# Patient Record
Sex: FEMALE | Race: OTHER | Employment: FULL TIME | URBAN - METROPOLITAN AREA
[De-identification: names, ages, dates, MRNs, and addresses within clinical notes are randomized per-mention and may not be internally consistent; named-entity substitution may affect disease eponyms.]

---

## 2019-08-07 ENCOUNTER — OFFICE VISIT (OUTPATIENT)
Dept: NEUROLOGY | Facility: CLINIC | Age: 19
End: 2019-08-07
Payer: MEDICAID

## 2019-08-07 ENCOUNTER — TELEPHONE (OUTPATIENT)
Dept: NEUROLOGY | Facility: CLINIC | Age: 19
End: 2019-08-07

## 2019-08-07 VITALS
WEIGHT: 157 LBS | HEART RATE: 68 BPM | RESPIRATION RATE: 16 BRPM | DIASTOLIC BLOOD PRESSURE: 74 MMHG | SYSTOLIC BLOOD PRESSURE: 122 MMHG

## 2019-08-07 DIAGNOSIS — G40.909 SEIZURE DISORDER (HCC): Primary | ICD-10-CM

## 2019-08-07 DIAGNOSIS — R51.9 CHRONIC DAILY HEADACHE: ICD-10-CM

## 2019-08-07 PROCEDURE — 99204 OFFICE O/P NEW MOD 45 MIN: CPT | Performed by: OTHER

## 2019-08-07 RX ORDER — LEVETIRACETAM 500 MG/1
500 TABLET ORAL 2 TIMES DAILY
Qty: 60 TABLET | Refills: 2 | Status: SHIPPED | OUTPATIENT
Start: 2019-08-07 | End: 2019-08-26

## 2019-08-07 RX ORDER — LEVETIRACETAM 500 MG/1
500 TABLET ORAL 2 TIMES DAILY
COMMUNITY
End: 2019-08-07

## 2019-08-07 RX ORDER — TOPIRAMATE 25 MG/1
25 TABLET ORAL NIGHTLY
Qty: 30 TABLET | Refills: 2 | Status: SHIPPED | OUTPATIENT
Start: 2019-08-07 | End: 2019-08-26

## 2019-08-07 NOTE — TELEPHONE ENCOUNTER
DOUG faxed to Dr Joseph Ribera office at fax number [phone]; confirmation received. Signed DOUG sent to Scanning Department.

## 2019-08-07 NOTE — PROGRESS NOTES
HPI:    Patient ID: Cindy Reyes is a 22year old female. PCP: Dr Alan Patient    HPI  Crissy Yu is a 22year old female with history of seizures who presented to established care with us.  She was following with Dr Sabas Begum Pediatric neurologist. She Tab Take 500 mg by mouth 2 (two) times daily. Disp:  Rfl:      Allergies: Allergies no known allergies   PHYSICAL EXAM:   Physical Exam    Vitals reviewed  General: well developed, well nourished  HEENT: Normocephalic and atraumatic.    Cardiovascular: Romina Heller Topamax for headache prevention. Hydrate well. Follow up with your primary for anemia management      Thank you for allowing us to participate in your patient's care. Please do not hesitate to call if you have any questions.      Charis Kanner, MD Christena Serene

## 2019-08-07 NOTE — PROGRESS NOTES
The patient is here for seizures. The patient states her seizures started in 2013. The patient states her last seizure was on 01/25/19. Patient is having fatigue, headaches daily and lightheadedness.  The patient denies any balance issues, memory or speech

## 2019-08-13 ENCOUNTER — ORDER TRANSCRIPTION (OUTPATIENT)
Dept: ADMINISTRATIVE | Facility: HOSPITAL | Age: 19
End: 2019-08-13

## 2019-08-13 DIAGNOSIS — G40.909 SEIZURE DISORDER (HCC): Primary | ICD-10-CM

## 2019-08-18 ENCOUNTER — HOSPITAL ENCOUNTER (OUTPATIENT)
Dept: MRI IMAGING | Age: 19
Discharge: HOME | End: 2019-08-18
Attending: Other
Payer: MEDICAID

## 2019-08-18 DIAGNOSIS — R51.9 CHRONIC DAILY HEADACHE: ICD-10-CM

## 2019-08-18 DIAGNOSIS — G40.909 SEIZURE DISORDER (HCC): ICD-10-CM

## 2019-08-18 PROCEDURE — A9575 INJ GADOTERATE MEGLUMI 0.1ML: HCPCS | Performed by: OTHER

## 2019-08-18 PROCEDURE — 70553 MRI BRAIN STEM W/O & W/DYE: CPT | Performed by: OTHER

## 2019-08-22 ENCOUNTER — TELEPHONE (OUTPATIENT)
Dept: NEUROLOGY | Facility: CLINIC | Age: 19
End: 2019-08-22

## 2019-08-22 NOTE — TELEPHONE ENCOUNTER
----- Message from Osmar Yarbrough MD sent at 8/21/2019  1:59 PM CDT -----  Multiple areas of chronic blood products ? Small cavernomas which patient has a known history of some abnormalities on previous MRI brain images.  Please bring old MRI images and

## 2019-08-22 NOTE — TELEPHONE ENCOUNTER
Pt called back, informed her of results below. Also requested pt bring in past MRI images and reports. Pt expressed understanding and was encouraged to call office with any further questions.

## 2019-08-26 ENCOUNTER — HOSPITAL ENCOUNTER (EMERGENCY)
Facility: HOSPITAL | Age: 19
Discharge: HOME | End: 2019-08-26
Attending: EMERGENCY MEDICINE
Payer: MEDICAID

## 2019-08-26 ENCOUNTER — TELEPHONE (OUTPATIENT)
Dept: NEUROLOGY | Facility: CLINIC | Age: 19
End: 2019-08-26

## 2019-08-26 VITALS
WEIGHT: 157 LBS | HEART RATE: 65 BPM | RESPIRATION RATE: 12 BRPM | OXYGEN SATURATION: 100 % | BODY MASS INDEX: 27.82 KG/M2 | DIASTOLIC BLOOD PRESSURE: 76 MMHG | SYSTOLIC BLOOD PRESSURE: 123 MMHG | TEMPERATURE: 98 F | HEIGHT: 63 IN

## 2019-08-26 DIAGNOSIS — R51.9 FREQUENT HEADACHES: Primary | ICD-10-CM

## 2019-08-26 LAB
ALBUMIN SERPL-MCNC: 4 G/DL (ref 3.4–5)
ALBUMIN/GLOB SERPL: 1.2 {RATIO} (ref 1–2)
ALP LIVER SERPL-CCNC: 91 U/L (ref 52–144)
ALT SERPL-CCNC: 36 U/L (ref 13–56)
ANION GAP SERPL CALC-SCNC: 5 MMOL/L (ref 0–18)
AST SERPL-CCNC: 14 U/L (ref 15–37)
BASOPHILS # BLD AUTO: 0.03 X10(3) UL (ref 0–0.2)
BASOPHILS NFR BLD AUTO: 0.4 %
BILIRUB SERPL-MCNC: 0.1 MG/DL (ref 0.1–2)
BILIRUB UR QL STRIP.AUTO: NEGATIVE
BUN BLD-MCNC: 14 MG/DL (ref 7–18)
BUN/CREAT SERPL: 21.2 (ref 10–20)
CALCIUM BLD-MCNC: 8.9 MG/DL (ref 8.5–10.1)
CHLORIDE SERPL-SCNC: 112 MMOL/L (ref 98–112)
CLARITY UR REFRACT.AUTO: CLEAR
CO2 SERPL-SCNC: 24 MMOL/L (ref 21–32)
COLOR UR AUTO: YELLOW
CREAT BLD-MCNC: 0.66 MG/DL (ref 0.55–1.02)
DEPRECATED RDW RBC AUTO: 40.8 FL (ref 35.1–46.3)
EOSINOPHIL # BLD AUTO: 0.17 X10(3) UL (ref 0–0.7)
EOSINOPHIL NFR BLD AUTO: 2.1 %
ERYTHROCYTE [DISTWIDTH] IN BLOOD BY AUTOMATED COUNT: 14.6 % (ref 11–15)
EXPIRATION DATE: NORMAL
GLOBULIN PLAS-MCNC: 3.4 G/DL (ref 2.8–4.4)
GLUCOSE BLD-MCNC: 85 MG/DL (ref 70–99)
GLUCOSE UR STRIP.AUTO-MCNC: NEGATIVE MG/DL
HAV IGM SER QL: 2.1 MG/DL (ref 1.6–2.6)
HCT VFR BLD AUTO: 35.8 % (ref 35–48)
HGB BLD-MCNC: 11.2 G/DL (ref 12–16)
IMM GRANULOCYTES # BLD AUTO: 0.02 X10(3) UL (ref 0–1)
IMM GRANULOCYTES NFR BLD: 0.3 %
KETONES UR STRIP.AUTO-MCNC: NEGATIVE MG/DL
LYMPHOCYTES # BLD AUTO: 3.42 X10(3) UL (ref 1–4)
LYMPHOCYTES NFR BLD AUTO: 43.1 %
M PROTEIN MFR SERPL ELPH: 7.4 G/DL (ref 6.4–8.2)
MCH RBC QN AUTO: 24.3 PG (ref 26–34)
MCHC RBC AUTO-ENTMCNC: 31.3 G/DL (ref 31–37)
MCV RBC AUTO: 77.8 FL (ref 80–100)
MONOCYTES # BLD AUTO: 0.48 X10(3) UL (ref 0.1–1)
MONOCYTES NFR BLD AUTO: 6.1 %
NEUTROPHILS # BLD AUTO: 3.81 X10 (3) UL (ref 1.5–7.7)
NEUTROPHILS # BLD AUTO: 3.81 X10(3) UL (ref 1.5–7.7)
NEUTROPHILS NFR BLD AUTO: 48 %
NITRITE UR QL STRIP.AUTO: POSITIVE
OSMOLALITY SERPL CALC.SUM OF ELEC: 292 MOSM/KG (ref 275–295)
PH UR STRIP.AUTO: 6 [PH] (ref 4.5–8)
PLATELET # BLD AUTO: 276 10(3)UL (ref 150–450)
POCT LOT NUMBER: NORMAL
POCT URINE PREGNANCY: NEGATIVE
POTASSIUM SERPL-SCNC: 3.8 MMOL/L (ref 3.5–5.1)
PROT UR STRIP.AUTO-MCNC: NEGATIVE MG/DL
RBC # BLD AUTO: 4.6 X10(6)UL (ref 3.8–5.3)
SODIUM SERPL-SCNC: 141 MMOL/L (ref 136–145)
SP GR UR STRIP.AUTO: 1.01 (ref 1–1.03)
UROBILINOGEN UR STRIP.AUTO-MCNC: <2 MG/DL
WBC # BLD AUTO: 7.9 X10(3) UL (ref 4–11)

## 2019-08-26 PROCEDURE — 85025 COMPLETE CBC W/AUTO DIFF WBC: CPT | Performed by: EMERGENCY MEDICINE

## 2019-08-26 PROCEDURE — 99284 EMERGENCY DEPT VISIT MOD MDM: CPT

## 2019-08-26 PROCEDURE — 96361 HYDRATE IV INFUSION ADD-ON: CPT

## 2019-08-26 PROCEDURE — 83735 ASSAY OF MAGNESIUM: CPT | Performed by: EMERGENCY MEDICINE

## 2019-08-26 PROCEDURE — 96374 THER/PROPH/DIAG INJ IV PUSH: CPT

## 2019-08-26 PROCEDURE — 96375 TX/PRO/DX INJ NEW DRUG ADDON: CPT

## 2019-08-26 PROCEDURE — 81001 URINALYSIS AUTO W/SCOPE: CPT | Performed by: EMERGENCY MEDICINE

## 2019-08-26 PROCEDURE — 81025 URINE PREGNANCY TEST: CPT

## 2019-08-26 PROCEDURE — 80053 COMPREHEN METABOLIC PANEL: CPT | Performed by: EMERGENCY MEDICINE

## 2019-08-26 RX ORDER — METOCLOPRAMIDE 10 MG/1
10 TABLET ORAL 3 TIMES DAILY PRN
Qty: 20 TABLET | Refills: 0 | Status: SHIPPED | OUTPATIENT
Start: 2019-08-26 | End: 2019-09-25

## 2019-08-26 RX ORDER — KETOROLAC TROMETHAMINE 30 MG/ML
30 INJECTION, SOLUTION INTRAMUSCULAR; INTRAVENOUS ONCE
Status: COMPLETED | OUTPATIENT
Start: 2019-08-26 | End: 2019-08-26

## 2019-08-26 RX ORDER — METOCLOPRAMIDE HYDROCHLORIDE 5 MG/ML
10 INJECTION INTRAMUSCULAR; INTRAVENOUS ONCE
Status: COMPLETED | OUTPATIENT
Start: 2019-08-26 | End: 2019-08-26

## 2019-08-26 RX ORDER — DIPHENHYDRAMINE HYDROCHLORIDE 50 MG/ML
25 INJECTION INTRAMUSCULAR; INTRAVENOUS ONCE
Status: COMPLETED | OUTPATIENT
Start: 2019-08-26 | End: 2019-08-26

## 2019-08-26 NOTE — ED INITIAL ASSESSMENT (HPI)
Pt c/o HA x 2 months, being treated by a neurologist, pt c/o numbness to the left side of her face x 3 days. Also c/o nausea and dizziness.

## 2019-08-26 NOTE — ED PROVIDER NOTES
Patient Seen in: [facility] Emergency Department    History   Patient presents with:  Headache (neurologic)  Numbness Weakness (neurologic)    Stated Complaint: headache 2months numbness left side since friday    HPI    Patient is a 23-year-old fema and time. She appears well-developed and well-nourished. HENT:   Head: Normocephalic and atraumatic. Mouth/Throat: Oropharynx is clear and moist.   Eyes: Pupils are equal, round, and reactive to light.  Conjunctivae and EOM are normal.   Neck: Normal ra Please view results for these tests on the individual orders.    POCT PREGNANCY, URINE   RAINBOW DRAW BLUE   RAINBOW DRAW LAVENDER   RAINBOW DRAW LIGHT GREEN   RAINBOW DRAW GOLD              MDM   47-year-old female with chronic headaches, seizure disor

## 2019-08-26 NOTE — TELEPHONE ENCOUNTER
Spoke with pt, she states that she was seen in ED over the weekend for her \"whole face \" being numb. Pt states she was unsure of diagnosis. Pt states during this call that her \"entire left side of body is number. \" Advised pt that if these are new symp

## 2019-08-27 ENCOUNTER — APPOINTMENT (OUTPATIENT)
Dept: CT IMAGING | Facility: HOSPITAL | Age: 19
End: 2019-08-27
Attending: PEDIATRICS
Payer: MEDICAID

## 2019-08-27 ENCOUNTER — APPOINTMENT (OUTPATIENT)
Dept: GENERAL RADIOLOGY | Facility: HOSPITAL | Age: 19
End: 2019-08-27
Attending: PEDIATRICS
Payer: MEDICAID

## 2019-08-27 ENCOUNTER — HOSPITAL ENCOUNTER (EMERGENCY)
Facility: HOSPITAL | Age: 19
Discharge: HOME | End: 2019-08-27
Attending: PEDIATRICS
Payer: MEDICAID

## 2019-08-27 VITALS
HEART RATE: 80 BPM | RESPIRATION RATE: 16 BRPM | TEMPERATURE: 98 F | SYSTOLIC BLOOD PRESSURE: 115 MMHG | BODY MASS INDEX: 27.31 KG/M2 | OXYGEN SATURATION: 100 % | WEIGHT: 154.13 LBS | HEIGHT: 63 IN | DIASTOLIC BLOOD PRESSURE: 66 MMHG

## 2019-08-27 DIAGNOSIS — N30.01 ACUTE CYSTITIS WITH HEMATURIA: Primary | ICD-10-CM

## 2019-08-27 LAB
ALBUMIN SERPL-MCNC: 3.9 G/DL (ref 3.4–5)
ALBUMIN/GLOB SERPL: 1.1 {RATIO} (ref 1–2)
ALP LIVER SERPL-CCNC: 89 U/L (ref 52–144)
ALT SERPL-CCNC: 34 U/L (ref 13–56)
ANION GAP SERPL CALC-SCNC: 8 MMOL/L (ref 0–18)
AST SERPL-CCNC: 16 U/L (ref 15–37)
ATRIAL RATE: 90 BPM
BASOPHILS # BLD AUTO: 0.02 X10(3) UL (ref 0–0.2)
BASOPHILS NFR BLD AUTO: 0.2 %
BILIRUB SERPL-MCNC: 0.3 MG/DL (ref 0.1–2)
BILIRUB UR QL STRIP.AUTO: NEGATIVE
BUN BLD-MCNC: 12 MG/DL (ref 7–18)
BUN/CREAT SERPL: 18.8 (ref 10–20)
CALCIUM BLD-MCNC: 8.5 MG/DL (ref 8.5–10.1)
CHLORIDE SERPL-SCNC: 113 MMOL/L (ref 98–112)
CO2 SERPL-SCNC: 23 MMOL/L (ref 21–32)
COLOR UR AUTO: YELLOW
CREAT BLD-MCNC: 0.64 MG/DL (ref 0.55–1.02)
D-DIMER: 0.32 UG/ML FEU (ref ?–0.5)
DEPRECATED RDW RBC AUTO: 40.6 FL (ref 35.1–46.3)
EOSINOPHIL # BLD AUTO: 0.09 X10(3) UL (ref 0–0.7)
EOSINOPHIL NFR BLD AUTO: 0.8 %
ERYTHROCYTE [DISTWIDTH] IN BLOOD BY AUTOMATED COUNT: 14.6 % (ref 11–15)
GLOBULIN PLAS-MCNC: 3.7 G/DL (ref 2.8–4.4)
GLUCOSE BLD-MCNC: 101 MG/DL (ref 70–99)
GLUCOSE UR STRIP.AUTO-MCNC: NEGATIVE MG/DL
HCT VFR BLD AUTO: 35.9 % (ref 35–48)
HGB BLD-MCNC: 11.2 G/DL (ref 12–16)
IMM GRANULOCYTES # BLD AUTO: 0.03 X10(3) UL (ref 0–1)
IMM GRANULOCYTES NFR BLD: 0.3 %
KETONES UR STRIP.AUTO-MCNC: NEGATIVE MG/DL
LYMPHOCYTES # BLD AUTO: 2.79 X10(3) UL (ref 1–4)
LYMPHOCYTES NFR BLD AUTO: 24.5 %
M PROTEIN MFR SERPL ELPH: 7.6 G/DL (ref 6.4–8.2)
MCH RBC QN AUTO: 24.1 PG (ref 26–34)
MCHC RBC AUTO-ENTMCNC: 31.2 G/DL (ref 31–37)
MCV RBC AUTO: 77.2 FL (ref 80–100)
MONOCYTES # BLD AUTO: 0.64 X10(3) UL (ref 0.1–1)
MONOCYTES NFR BLD AUTO: 5.6 %
NEUTROPHILS # BLD AUTO: 7.83 X10 (3) UL (ref 1.5–7.7)
NEUTROPHILS # BLD AUTO: 7.83 X10(3) UL (ref 1.5–7.7)
NEUTROPHILS NFR BLD AUTO: 68.6 %
NITRITE UR QL STRIP.AUTO: POSITIVE
OSMOLALITY SERPL CALC.SUM OF ELEC: 298 MOSM/KG (ref 275–295)
P AXIS: 60 DEGREES
P-R INTERVAL: 140 MS
PH UR STRIP.AUTO: 6 [PH] (ref 4.5–8)
PLATELET # BLD AUTO: 298 10(3)UL (ref 150–450)
POCT URINE PREGNANCY: NEGATIVE
POTASSIUM SERPL-SCNC: 3.4 MMOL/L (ref 3.5–5.1)
PROT UR STRIP.AUTO-MCNC: 30 MG/DL
Q-T INTERVAL: 344 MS
QRS DURATION: 78 MS
QTC CALCULATION (BEZET): 420 MS
R AXIS: 64 DEGREES
RBC # BLD AUTO: 4.65 X10(6)UL (ref 3.8–5.3)
RBC #/AREA URNS AUTO: >10 /HPF
SODIUM SERPL-SCNC: 144 MMOL/L (ref 136–145)
SP GR UR STRIP.AUTO: 1.02 (ref 1–1.03)
T AXIS: 60 DEGREES
TROPONIN I SERPL-MCNC: <0.045 NG/ML (ref ?–0.04)
UROBILINOGEN UR STRIP.AUTO-MCNC: <2 MG/DL
VENTRICULAR RATE: 90 BPM
WBC # BLD AUTO: 11.4 X10(3) UL (ref 4–11)
WBC #/AREA URNS AUTO: >50 /HPF

## 2019-08-27 PROCEDURE — 85379 FIBRIN DEGRADATION QUANT: CPT | Performed by: PEDIATRICS

## 2019-08-27 PROCEDURE — 87186 SC STD MICRODIL/AGAR DIL: CPT | Performed by: PEDIATRICS

## 2019-08-27 PROCEDURE — 93010 ELECTROCARDIOGRAM REPORT: CPT

## 2019-08-27 PROCEDURE — 99285 EMERGENCY DEPT VISIT HI MDM: CPT

## 2019-08-27 PROCEDURE — 87088 URINE BACTERIA CULTURE: CPT | Performed by: PEDIATRICS

## 2019-08-27 PROCEDURE — 71046 X-RAY EXAM CHEST 2 VIEWS: CPT | Performed by: PEDIATRICS

## 2019-08-27 PROCEDURE — 96361 HYDRATE IV INFUSION ADD-ON: CPT

## 2019-08-27 PROCEDURE — 85025 COMPLETE CBC W/AUTO DIFF WBC: CPT | Performed by: PEDIATRICS

## 2019-08-27 PROCEDURE — 74177 CT ABD & PELVIS W/CONTRAST: CPT | Performed by: PEDIATRICS

## 2019-08-27 PROCEDURE — 81001 URINALYSIS AUTO W/SCOPE: CPT | Performed by: PEDIATRICS

## 2019-08-27 PROCEDURE — 80053 COMPREHEN METABOLIC PANEL: CPT | Performed by: PEDIATRICS

## 2019-08-27 PROCEDURE — 87086 URINE CULTURE/COLONY COUNT: CPT | Performed by: PEDIATRICS

## 2019-08-27 PROCEDURE — 93005 ELECTROCARDIOGRAM TRACING: CPT

## 2019-08-27 PROCEDURE — 96374 THER/PROPH/DIAG INJ IV PUSH: CPT

## 2019-08-27 PROCEDURE — 84484 ASSAY OF TROPONIN QUANT: CPT | Performed by: PEDIATRICS

## 2019-08-27 PROCEDURE — 81025 URINE PREGNANCY TEST: CPT

## 2019-08-27 RX ORDER — KETOROLAC TROMETHAMINE 30 MG/ML
30 INJECTION, SOLUTION INTRAMUSCULAR; INTRAVENOUS ONCE
Status: COMPLETED | OUTPATIENT
Start: 2019-08-27 | End: 2019-08-27

## 2019-08-27 RX ORDER — SULFAMETHOXAZOLE AND TRIMETHOPRIM 800; 160 MG/1; MG/1
1 TABLET ORAL 2 TIMES DAILY
Qty: 14 TABLET | Refills: 0 | Status: SHIPPED | OUTPATIENT
Start: 2019-08-27 | End: 2019-09-03

## 2019-08-27 RX ORDER — KETOROLAC TROMETHAMINE 30 MG/ML
INJECTION, SOLUTION INTRAMUSCULAR; INTRAVENOUS
Status: COMPLETED
Start: 2019-08-27 | End: 2019-08-27

## 2019-08-27 NOTE — ED PROVIDER NOTES
Patient Seen in: [facility] Emergency Department    History   Patient presents with:  Dyspnea SHAMA SOB (respiratory)  Chest Pain Angina (cardiovascular)    Stated Complaint: CP, SHAMA    HPI    70-year-old female who returns to our ED after being evalu person, place, and time. She appears well-developed and well-nourished. No distress. HENT:   Head: Normocephalic and atraumatic.    Right Ear: External ear normal.   Left Ear: External ear normal.   Nose: Nose normal.   Mouth/Throat: Oropharynx is clear a Urine Cloudy (*)     Blood Urine Moderate (*)     Protein Urine 30  (*)     Nitrite Urine Positive (*)     Leukocyte Esterase Urine Large (*)     WBC Urine >50 (*)     RBC URINE >10 (*)     Bacteria Urine Rare (*)     Mucous Urine 1+ (*)     All other comp Rhythm  Reading: normal sinus              Radiology:  Any imaging ordered independently visualized and interpreted by myself, along with review of radiologist's interpretation.      Xr Chest Pa + Lat Chest (cpt=71046)    Result Date: 8/27/2019  CONCLUSION: to UTI, prescribed Bactrim with close follow-up. I have considered other serious etiologies for this patient's complaints, however the presentation is not consistent with such entities.  Patient or caregiver understands the course of events that occurred

## 2019-08-27 NOTE — TELEPHONE ENCOUNTER
Patient called and scheduled for 9/10/2019 appointment at 10:40. EEG scheduled for 9 am.    Routed to provider to inform of ER visit and new appointment time.

## 2019-08-27 NOTE — ED INITIAL ASSESSMENT (HPI)
Pt here for sob since last night and rib pain, dizziness. Pt has an inhaler last night. Pt reports not helping. Pt was here yesterday for headache and dizziness.

## 2019-08-29 RX ORDER — CEPHALEXIN 500 MG/1
500 CAPSULE ORAL 2 TIMES DAILY
Qty: 20 CAPSULE | Refills: 0 | Status: SHIPPED | OUTPATIENT
Start: 2019-08-29 | End: 2019-09-08

## 2019-09-10 ENCOUNTER — NURSE ONLY (OUTPATIENT)
Dept: ELECTROPHYSIOLOGY | Facility: HOSPITAL | Age: 19
End: 2019-09-10
Attending: Other
Payer: MEDICAID

## 2019-09-10 ENCOUNTER — PROCEDURE VISIT (OUTPATIENT)
Dept: NEUROLOGY | Facility: CLINIC | Age: 19
End: 2019-09-10

## 2019-09-10 ENCOUNTER — TELEPHONE (OUTPATIENT)
Dept: NEUROLOGY | Facility: CLINIC | Age: 19
End: 2019-09-10

## 2019-09-10 ENCOUNTER — OFFICE VISIT (OUTPATIENT)
Dept: NEUROLOGY | Facility: CLINIC | Age: 19
End: 2019-09-10
Payer: MEDICAID

## 2019-09-10 VITALS
BODY MASS INDEX: 27 KG/M2 | RESPIRATION RATE: 16 BRPM | DIASTOLIC BLOOD PRESSURE: 72 MMHG | HEART RATE: 74 BPM | SYSTOLIC BLOOD PRESSURE: 130 MMHG | WEIGHT: 154 LBS

## 2019-09-10 DIAGNOSIS — G40.909 SEIZURE DISORDER (HCC): ICD-10-CM

## 2019-09-10 DIAGNOSIS — R90.89 ABNORMAL FINDING ON MRI OF BRAIN: ICD-10-CM

## 2019-09-10 DIAGNOSIS — R29.818 AURAS: ICD-10-CM

## 2019-09-10 DIAGNOSIS — G40.909 SEIZURE DISORDER (HCC): Primary | ICD-10-CM

## 2019-09-10 PROCEDURE — 95819 EEG AWAKE AND ASLEEP: CPT | Performed by: OTHER

## 2019-09-10 PROCEDURE — 99214 OFFICE O/P EST MOD 30 MIN: CPT | Performed by: OTHER

## 2019-09-10 RX ORDER — BUTALBITAL, ACETAMINOPHEN, CAFFEINE, AND CODEINE PHOSPHATE 50; 300; 40; 30 MG/1; MG/1; MG/1; MG/1
1 CAPSULE ORAL AS NEEDED
COMMUNITY
Start: 2019-08-24 | End: 2019-12-18

## 2019-09-10 RX ORDER — LEVETIRACETAM 500 MG/1
TABLET ORAL
Qty: 90 TABLET | Refills: 2 | Status: SHIPPED | OUTPATIENT
Start: 2019-09-10 | End: 2019-11-06

## 2019-09-10 NOTE — TELEPHONE ENCOUNTER
Pt reguesting imaging from [id number] to be sent from [facility] to Dr. Charly Mendez.

## 2019-09-10 NOTE — PROGRESS NOTES
The patient denies any recent seizures since her last office visit. The patient is having on and off numbness on her whole body.

## 2019-09-10 NOTE — PROGRESS NOTES
HPI:    Patient ID: Nathaly Story is a 22year old female. PCP: Dr Kulwant Melissa    HPI    Follow up visit:  States she was having on and off headaches and getting aura like symptoms- numbness and tingling on the right side or sometime whole body.  She is taki use: Never         Review of Systems   Constitutional: Negative. HENT: Negative. Eyes: Negative. Respiratory: Negative. Cardiovascular: Negative. Gastrointestinal: Negative. Endocrine: Negative. Genitourinary: Negative.     Musculoskele 3. Multiple foci of blooming artifacts in the posterior aspect of bilateral temporal lobes and within bilateral parietal lobes is noted. 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   This could be sequelae of chronic hemorrhagic products from prior   tra

## 2019-09-20 ENCOUNTER — APPOINTMENT (OUTPATIENT)
Dept: GENERAL RADIOLOGY | Facility: HOSPITAL | Age: 19
End: 2019-09-20
Attending: PHYSICIAN ASSISTANT
Payer: MEDICAID

## 2019-09-20 ENCOUNTER — HOSPITAL ENCOUNTER (EMERGENCY)
Facility: HOSPITAL | Age: 19
Discharge: HOME | End: 2019-09-20
Attending: EMERGENCY MEDICINE
Payer: MEDICAID

## 2019-09-20 VITALS
OXYGEN SATURATION: 99 % | TEMPERATURE: 99 F | WEIGHT: 158 LBS | BODY MASS INDEX: 28 KG/M2 | DIASTOLIC BLOOD PRESSURE: 81 MMHG | RESPIRATION RATE: 18 BRPM | SYSTOLIC BLOOD PRESSURE: 112 MMHG | HEART RATE: 73 BPM

## 2019-09-20 DIAGNOSIS — J06.9 VIRAL UPPER RESPIRATORY ILLNESS: ICD-10-CM

## 2019-09-20 DIAGNOSIS — R51.9 HEADACHE DISORDER: Primary | ICD-10-CM

## 2019-09-20 LAB
ANION GAP SERPL CALC-SCNC: 5 MMOL/L (ref 0–18)
BASOPHILS # BLD AUTO: 0.04 X10(3) UL (ref 0–0.2)
BASOPHILS NFR BLD AUTO: 0.4 %
BILIRUB UR QL STRIP.AUTO: NEGATIVE
BUN BLD-MCNC: 8 MG/DL (ref 7–18)
BUN/CREAT SERPL: 11.6 (ref 10–20)
CALCIUM BLD-MCNC: 9.1 MG/DL (ref 8.5–10.1)
CHLORIDE SERPL-SCNC: 111 MMOL/L (ref 98–112)
CLARITY UR REFRACT.AUTO: CLEAR
CO2 SERPL-SCNC: 25 MMOL/L (ref 21–32)
COLOR UR AUTO: YELLOW
CREAT BLD-MCNC: 0.69 MG/DL (ref 0.55–1.02)
DEPRECATED RDW RBC AUTO: 41.8 FL (ref 35.1–46.3)
EOSINOPHIL # BLD AUTO: 0.3 X10(3) UL (ref 0–0.7)
EOSINOPHIL NFR BLD AUTO: 3.2 %
ERYTHROCYTE [DISTWIDTH] IN BLOOD BY AUTOMATED COUNT: 15.1 % (ref 11–15)
GLUCOSE BLD-MCNC: 102 MG/DL (ref 70–99)
GLUCOSE UR STRIP.AUTO-MCNC: NEGATIVE MG/DL
HCT VFR BLD AUTO: 34.6 % (ref 35–48)
HGB BLD-MCNC: 11.1 G/DL (ref 12–16)
IMM GRANULOCYTES # BLD AUTO: 0.04 X10(3) UL (ref 0–1)
IMM GRANULOCYTES NFR BLD: 0.4 %
KETONES UR STRIP.AUTO-MCNC: NEGATIVE MG/DL
LEUKOCYTE ESTERASE UR QL STRIP.AUTO: NEGATIVE
LYMPHOCYTES # BLD AUTO: 3.62 X10(3) UL (ref 1–4)
LYMPHOCYTES NFR BLD AUTO: 38.5 %
MCH RBC QN AUTO: 24.7 PG (ref 26–34)
MCHC RBC AUTO-ENTMCNC: 32.1 G/DL (ref 31–37)
MCV RBC AUTO: 76.9 FL (ref 80–100)
MONOCYTES # BLD AUTO: 0.66 X10(3) UL (ref 0.1–1)
MONOCYTES NFR BLD AUTO: 7 %
NEUTROPHILS # BLD AUTO: 4.74 X10 (3) UL (ref 1.5–7.7)
NEUTROPHILS # BLD AUTO: 4.74 X10(3) UL (ref 1.5–7.7)
NEUTROPHILS NFR BLD AUTO: 50.5 %
NITRITE UR QL STRIP.AUTO: NEGATIVE
OSMOLALITY SERPL CALC.SUM OF ELEC: 291 MOSM/KG (ref 275–295)
PH UR STRIP.AUTO: 6 [PH] (ref 4.5–8)
PLATELET # BLD AUTO: 291 10(3)UL (ref 150–450)
POCT URINE PREGNANCY: NEGATIVE
POTASSIUM SERPL-SCNC: 3.7 MMOL/L (ref 3.5–5.1)
PROCEDURE CONTROL: YES
PROT UR STRIP.AUTO-MCNC: NEGATIVE MG/DL
RBC # BLD AUTO: 4.5 X10(6)UL (ref 3.8–5.3)
RBC #/AREA URNS AUTO: >10 /HPF
SODIUM SERPL-SCNC: 141 MMOL/L (ref 136–145)
SP GR UR STRIP.AUTO: 1.01 (ref 1–1.03)
UROBILINOGEN UR STRIP.AUTO-MCNC: <2 MG/DL
WBC # BLD AUTO: 9.4 X10(3) UL (ref 4–11)

## 2019-09-20 PROCEDURE — 80048 BASIC METABOLIC PNL TOTAL CA: CPT | Performed by: PHYSICIAN ASSISTANT

## 2019-09-20 PROCEDURE — 81025 URINE PREGNANCY TEST: CPT

## 2019-09-20 PROCEDURE — 87086 URINE CULTURE/COLONY COUNT: CPT | Performed by: PHYSICIAN ASSISTANT

## 2019-09-20 PROCEDURE — 71045 X-RAY EXAM CHEST 1 VIEW: CPT | Performed by: PHYSICIAN ASSISTANT

## 2019-09-20 PROCEDURE — 96361 HYDRATE IV INFUSION ADD-ON: CPT

## 2019-09-20 PROCEDURE — 99284 EMERGENCY DEPT VISIT MOD MDM: CPT

## 2019-09-20 PROCEDURE — 85025 COMPLETE CBC W/AUTO DIFF WBC: CPT | Performed by: PHYSICIAN ASSISTANT

## 2019-09-20 PROCEDURE — 96374 THER/PROPH/DIAG INJ IV PUSH: CPT

## 2019-09-20 PROCEDURE — 96375 TX/PRO/DX INJ NEW DRUG ADDON: CPT

## 2019-09-20 PROCEDURE — 81001 URINALYSIS AUTO W/SCOPE: CPT | Performed by: PHYSICIAN ASSISTANT

## 2019-09-20 RX ORDER — DIPHENHYDRAMINE HYDROCHLORIDE 50 MG/ML
25 INJECTION INTRAMUSCULAR; INTRAVENOUS ONCE
Status: COMPLETED | OUTPATIENT
Start: 2019-09-20 | End: 2019-09-20

## 2019-09-20 RX ORDER — KETOROLAC TROMETHAMINE 30 MG/ML
30 INJECTION, SOLUTION INTRAMUSCULAR; INTRAVENOUS ONCE
Status: COMPLETED | OUTPATIENT
Start: 2019-09-20 | End: 2019-09-20

## 2019-09-20 RX ORDER — METOCLOPRAMIDE HYDROCHLORIDE 5 MG/ML
10 INJECTION INTRAMUSCULAR; INTRAVENOUS ONCE
Status: COMPLETED | OUTPATIENT
Start: 2019-09-20 | End: 2019-09-20

## 2019-09-21 NOTE — ED PROVIDER NOTES
Patient Seen in: [facility] Emergency Department      History   Patient presents with:  Headache (neurologic)  Sore Throat    Stated Complaint: headache, sore throat    HPI    Patient is a 23-year-old female.   Medical history of migraine type headac cervical point tenderness. Cardiovascular: RRR, No appreciable m/r/g, no JVD, no Bruits   Lungs: Breath sounds equal and clear bilateral. No retractions. Abdominal: Soft exam. No distention. No pain to palpation all four quadrants. No organomegaly.    Jonathan Villafuerte 78-year-old with normal vital signs. No meningeal signs. IV line established. Fluid bolus, Reglan, Toradol Benadryl. Chest x-ray. Urine pregnancy. CBC BMP. CBC demonstrates hemoglobin 11.1. Most likely iron deficiency anemia. No leukocytosis.   Natalya Hunter

## 2019-09-23 ENCOUNTER — TELEPHONE (OUTPATIENT)
Dept: NEUROLOGY | Facility: CLINIC | Age: 19
End: 2019-09-23

## 2019-09-23 DIAGNOSIS — R51.9 CHRONIC DAILY HEADACHE: Primary | ICD-10-CM

## 2019-09-25 RX ORDER — SUMATRIPTAN 50 MG/1
TABLET, FILM COATED ORAL
Qty: 9 TABLET | Refills: 0 | Status: SHIPPED | OUTPATIENT
Start: 2019-09-25 | End: 2019-12-18

## 2019-09-25 NOTE — TELEPHONE ENCOUNTER
Nallely Reed MD  You 12 minutes ago (4:07 PM)      Unclear if true seizure aura or anxiety. Continue Keppra 1000 mg BID. Also per discussion with Dr. Joao Nichole for patient to have sumatriptan. Inform patient of risk to fetus if pregnant.
Per Dr. Oneal Aguilar, pt to increase Keppra to 1,000mg bid. Called pt's mother, informed her of provider recommendation. She did express understanding and was encouraged to call office back with further questions or concerns.
Pt mother states pt is being seen in [facility] due to migraine with face numbness and alternating left to right body numbness.  Pt mother also states pt complains of symptoms that consist of cold sensations travelling throughout her body, seemin
S: Patient calling TERENCE to state she went to [facility] ER in [place] last night d/t seizures and headache.     B:See notes below - patient to ER on 9/20/2019 and 9/24/2019 -      LOV 9/10/2019 (R90.89) Abnormal finding on MRI of brain     Plan: Old neurocysterico
Breath sounds are clear, no distress present, no wheeze, rales, rhonchi or tachypnea. Normal rate and effort.

## 2019-10-02 ENCOUNTER — OFFICE VISIT (OUTPATIENT)
Dept: NEUROLOGY | Facility: CLINIC | Age: 19
End: 2019-10-02
Payer: MEDICAID

## 2019-10-02 VITALS
HEART RATE: 70 BPM | WEIGHT: 155 LBS | RESPIRATION RATE: 16 BRPM | BODY MASS INDEX: 27 KG/M2 | SYSTOLIC BLOOD PRESSURE: 128 MMHG | DIASTOLIC BLOOD PRESSURE: 68 MMHG

## 2019-10-02 DIAGNOSIS — G43.109 MIGRAINE WITH AURA AND WITHOUT STATUS MIGRAINOSUS, NOT INTRACTABLE: ICD-10-CM

## 2019-10-02 DIAGNOSIS — G40.909 SEIZURE DISORDER (HCC): Primary | ICD-10-CM

## 2019-10-02 PROCEDURE — 99213 OFFICE O/P EST LOW 20 MIN: CPT | Performed by: OTHER

## 2019-10-02 PROCEDURE — 1111F DSCHRG MED/CURRENT MED MERGE: CPT | Performed by: OTHER

## 2019-10-02 NOTE — PROGRESS NOTES
HPI:    Patient ID: Sherry Rodriguez is a 22year old female. 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  She had 2 episodes of intractable headaches and whole body tingling and went to [employer] Medications:  SUMAtriptan Succinate 50 MG Oral Tab Use at onset; repeat once after 2 hours-ONLY 2 IN 24 HR MAX. This is a 30 day supply. Disp: 9 tablet Rfl: 0   Butalbital-APAP-Caff-Cod -10-30 MG Oral Cap Take 1 capsule by mouth as needed.  Disp:  Rfl advised to get those records    Follow up in about 3 months. No driving until further notice  See orders and medications filed with this encounter. The patient indicates understanding of these issues and agrees with the plan.         Oliver Leiva MD  E

## 2019-10-02 NOTE — PROGRESS NOTES
The patient states no seizures since leaving the hospital. Patient states she is having more fatigue than usual. Patient has has an increase in dizziness and lightheadedness. Patient headaches have decrease in intensity.

## 2019-10-09 ENCOUNTER — TELEPHONE (OUTPATIENT)
Dept: NEUROLOGY | Facility: CLINIC | Age: 19
End: 2019-10-09

## 2019-10-09 RX ORDER — METHYLPREDNISOLONE 4 MG/1
TABLET ORAL
Qty: 1 PACKAGE | Refills: 0 | Status: SHIPPED | OUTPATIENT
Start: 2019-10-09 | End: 2019-12-18 | Stop reason: ALTCHOICE

## 2019-10-09 NOTE — TELEPHONE ENCOUNTER
Fax sent to [facility] in [place] requesting Er records from 10/819 (urgent) with fax confirmation received. Patient's mother notified that Rx Medrol Dose Eliud was sent to [facility] and she patient can start it now.

## 2019-10-09 NOTE — TELEPHONE ENCOUNTER
Acute Migraine assessment:    Is this your typical migraine? Describe any change in character from past migraines.   No:     Location of Pain (select all that apply):  back of head  # Days pain has been present:  2-3 days    Describe the pain:  Tight Band

## 2019-10-09 NOTE — TELEPHONE ENCOUNTER
Spoke with patient's mother and notified her that Dr Dennis Roque would like the ER records faxed now for review. Advised to got to ER if Sx increase. Will call patient back after records reviewed.

## 2019-10-15 ENCOUNTER — APPOINTMENT (OUTPATIENT)
Dept: GENERAL RADIOLOGY | Facility: HOSPITAL | Age: 19
End: 2019-10-15
Attending: EMERGENCY MEDICINE
Payer: MEDICAID

## 2019-10-15 ENCOUNTER — HOSPITAL ENCOUNTER (EMERGENCY)
Facility: HOSPITAL | Age: 19
Discharge: HOME | End: 2019-10-15
Attending: EMERGENCY MEDICINE
Payer: MEDICAID

## 2019-10-15 VITALS
SYSTOLIC BLOOD PRESSURE: 125 MMHG | OXYGEN SATURATION: 93 % | WEIGHT: 151 LBS | RESPIRATION RATE: 18 BRPM | BODY MASS INDEX: 26.75 KG/M2 | DIASTOLIC BLOOD PRESSURE: 73 MMHG | HEART RATE: 62 BPM | HEIGHT: 63 IN | TEMPERATURE: 99 F

## 2019-10-15 DIAGNOSIS — R07.89 CHEST PAIN, ATYPICAL: ICD-10-CM

## 2019-10-15 DIAGNOSIS — G44.209 TENSION HEADACHE: Primary | ICD-10-CM

## 2019-10-15 PROCEDURE — 99284 EMERGENCY DEPT VISIT MOD MDM: CPT

## 2019-10-15 PROCEDURE — 96374 THER/PROPH/DIAG INJ IV PUSH: CPT

## 2019-10-15 PROCEDURE — 93010 ELECTROCARDIOGRAM REPORT: CPT

## 2019-10-15 PROCEDURE — 93005 ELECTROCARDIOGRAM TRACING: CPT

## 2019-10-15 PROCEDURE — 81001 URINALYSIS AUTO W/SCOPE: CPT | Performed by: EMERGENCY MEDICINE

## 2019-10-15 PROCEDURE — 81001 URINALYSIS AUTO W/SCOPE: CPT

## 2019-10-15 PROCEDURE — 71045 X-RAY EXAM CHEST 1 VIEW: CPT | Performed by: EMERGENCY MEDICINE

## 2019-10-15 PROCEDURE — 81025 URINE PREGNANCY TEST: CPT

## 2019-10-15 RX ORDER — LEVETIRACETAM 500 MG/1
1000 TABLET ORAL ONCE
Status: COMPLETED | OUTPATIENT
Start: 2019-10-15 | End: 2019-10-15

## 2019-10-15 RX ORDER — KETOROLAC TROMETHAMINE 30 MG/ML
15 INJECTION, SOLUTION INTRAMUSCULAR; INTRAVENOUS ONCE
Status: COMPLETED | OUTPATIENT
Start: 2019-10-15 | End: 2019-10-15

## 2019-10-15 RX ORDER — IBUPROFEN 600 MG/1
600 TABLET ORAL EVERY 6 HOURS PRN
COMMUNITY

## 2019-10-15 RX ORDER — DEXAMETHASONE SODIUM PHOSPHATE 4 MG/ML
10 VIAL (ML) INJECTION ONCE
Status: DISCONTINUED | OUTPATIENT
Start: 2019-10-15 | End: 2019-10-15

## 2019-10-15 RX ORDER — METOCLOPRAMIDE HYDROCHLORIDE 5 MG/ML
10 INJECTION INTRAMUSCULAR; INTRAVENOUS ONCE
Status: DISCONTINUED | OUTPATIENT
Start: 2019-10-15 | End: 2019-10-15

## 2019-10-15 RX ORDER — DIPHENHYDRAMINE HYDROCHLORIDE 50 MG/ML
25 INJECTION INTRAMUSCULAR; INTRAVENOUS ONCE
Status: DISCONTINUED | OUTPATIENT
Start: 2019-10-15 | End: 2019-10-15

## 2019-10-16 NOTE — ED PROVIDER NOTES
Patient Seen in: [facility] Emergency Department      History   Patient presents with:  Headache (neurologic)  Nausea/Vomiting/Diarrhea (gastrointestinal)    Stated Complaint: headache & nausea    HPI    Patient is a 22-year-old female comes to ED f Temporal   SpO2 100 %   O2 Device None (Room air)       Current:/73   Pulse 62   Temp 99 °F (37.2 °C) (Temporal)   Resp 18   Ht 160 cm (5' 3\")   Wt 68.5 kg   LMP 10/06/2019   SpO2 93%   BMI 26.75 kg/m²         Physical Exam    GENERAL: No acute dist nausea  PATIENT STATED HISTORY: (As transcribed by Technologist)  Headache Nausea    FINDINGS:  The heart is normal in size. The lungs are clear of acute-appearing disease process. The costophrenic angles are sharp.   There is no active disease seen on th but to return immediately to the ER for worsening, concerning, new, changing or persisting symptoms. I discussed the case with the patient and they had no questions, complaints, or concerns. Patient felt comfortable going home.           Disposition and P

## 2019-10-16 NOTE — ED INITIAL ASSESSMENT (HPI)
C/o h/a's \"every day\". States she was dx'd with migraines but medications are not helping. C/o cough and shortness of breath, tactile fever earlier.

## 2019-10-23 ENCOUNTER — TELEPHONE (OUTPATIENT)
Dept: NEUROLOGY | Facility: CLINIC | Age: 19
End: 2019-10-23

## 2019-11-06 ENCOUNTER — TELEPHONE (OUTPATIENT)
Dept: NEUROLOGY | Facility: CLINIC | Age: 19
End: 2019-11-06

## 2019-11-06 DIAGNOSIS — G40.909 SEIZURE DISORDER (HCC): Primary | ICD-10-CM

## 2019-11-06 RX ORDER — LEVETIRACETAM 500 MG/1
TABLET ORAL
Qty: 120 TABLET | Refills: 2 | Status: SHIPPED | OUTPATIENT
Start: 2019-11-06 | End: 2020-02-13

## 2019-11-06 NOTE — TELEPHONE ENCOUNTER
Per PSR note, pharmacy requesting new RX stating pt to take Keppra 1,000mg bid. Per TE note from provider dated 09/23/2019     Uzair Don MD  You 12 minutes ago (4:07 PM)      Unclear if true seizure aura or anxiety. Continue Keppra 1000 mg BID.

## 2019-12-04 ENCOUNTER — TELEPHONE (OUTPATIENT)
Dept: NEUROLOGY | Facility: CLINIC | Age: 19
End: 2019-12-04

## 2019-12-18 ENCOUNTER — OFFICE VISIT (OUTPATIENT)
Dept: NEUROLOGY | Facility: CLINIC | Age: 19
End: 2019-12-18
Payer: MEDICAID

## 2019-12-18 VITALS
DIASTOLIC BLOOD PRESSURE: 48 MMHG | BODY MASS INDEX: 24 KG/M2 | RESPIRATION RATE: 14 BRPM | HEART RATE: 70 BPM | SYSTOLIC BLOOD PRESSURE: 104 MMHG | WEIGHT: 137 LBS

## 2019-12-18 DIAGNOSIS — R51.9 CHRONIC DAILY HEADACHE: ICD-10-CM

## 2019-12-18 DIAGNOSIS — R42 DIZZINESS: ICD-10-CM

## 2019-12-18 DIAGNOSIS — G40.909 SEIZURE DISORDER (HCC): Primary | ICD-10-CM

## 2019-12-18 PROCEDURE — 99213 OFFICE O/P EST LOW 20 MIN: CPT | Performed by: OTHER

## 2019-12-18 RX ORDER — FERROUS SULFATE 325(65) MG
325 TABLET ORAL
Qty: 30 TABLET | Refills: 2 | Status: SHIPPED | OUTPATIENT
Start: 2019-12-18 | End: 2020-11-18

## 2019-12-18 NOTE — PROGRESS NOTES
HPI:    Patient ID: Kamilla Corley is a 22year old female. PCP: Dr Charlene Mccollum    Seizures   Pertinent negatives include no headaches. 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  States since last office visit doing be other systems reviewed and are negative.            Current Outpatient Medications   Medication Sig Dispense Refill   • Ferrous Sulfate 325 (65 Fe) MG Oral Tab Take 1 tablet (325 mg total) by mouth daily with breakfast. 30 tablet 2   • levETIRAcetam 500 MG mouth daily with breakfast.       Imaging & Referrals:  None     #[id number]

## 2019-12-18 NOTE — PROGRESS NOTES
Pt reports feeling \"a lot better\" since last visit. She reports no seizure activity. Pt reports feeling \"dizzy\" consistently, she further report RLE weakness. Pt also reports experiencing facial \"twitching\" that is new since last visit.

## 2020-01-10 ENCOUNTER — TELEPHONE (OUTPATIENT)
Dept: NEUROLOGY | Facility: CLINIC | Age: 20
End: 2020-01-10

## 2020-02-13 DIAGNOSIS — G40.909 SEIZURE DISORDER (HCC): ICD-10-CM

## 2020-02-13 RX ORDER — LEVETIRACETAM 500 MG/1
TABLET ORAL
Qty: 120 TABLET | Refills: 0 | Status: SHIPPED | OUTPATIENT
Start: 2020-02-13 | End: 2020-03-02

## 2020-03-02 DIAGNOSIS — G40.909 SEIZURE DISORDER (HCC): ICD-10-CM

## 2020-03-02 RX ORDER — LEVETIRACETAM 500 MG/1
TABLET ORAL
Qty: 120 TABLET | Refills: 0 | Status: SHIPPED | OUTPATIENT
Start: 2020-03-02 | End: 2020-03-25

## 2020-03-02 NOTE — TELEPHONE ENCOUNTER
Medication: Keppra 500 mg    Date of last refill: 2/13/20 (#120/0)  [place]    Last office visit: 12/18/2019  Due back to clinic per last office note:  3 months  Date next office visit scheduled:    Future Appointment

## 2020-03-20 ENCOUNTER — TELEPHONE (OUTPATIENT)
Dept: NEUROLOGY | Facility: CLINIC | Age: 20
End: 2020-03-20

## 2020-03-20 NOTE — TELEPHONE ENCOUNTER
LMTCB - Patient and patient's mother (ok per HIPPA) called regarding appointment on 3/25/2020. Provider would like to have a telephone/virtual visit.     Appointment not cancelled at this time, however, patient will need to be scheduled for phone visit

## 2020-03-20 NOTE — TELEPHONE ENCOUNTER
Bella ramsey verbally consents to a Virtual/Telephone Check-In service on 03/25/2020 at 3:20    Patient understands and accepts financial responsibility for any deductible, co-insurance and/or co-pays associated with this service.     Wilmer Meza

## 2020-03-25 ENCOUNTER — TELEPHONE (OUTPATIENT)
Dept: NEUROLOGY | Facility: CLINIC | Age: 20
End: 2020-03-25

## 2020-03-25 DIAGNOSIS — G40.909 SEIZURE DISORDER (HCC): ICD-10-CM

## 2020-03-25 PROCEDURE — 99212 OFFICE O/P EST SF 10 MIN: CPT | Performed by: OTHER

## 2020-03-25 RX ORDER — LEVETIRACETAM 500 MG/1
TABLET ORAL
Qty: 120 TABLET | Refills: 2 | Status: SHIPPED | OUTPATIENT
Start: 2020-03-25 | End: 2020-07-15

## 2020-03-25 NOTE — TELEPHONE ENCOUNTER
Virtual/Telephone Check-In    Cindy Reyes verbally consents a Virtual/Telephone Check-In service on 03/25/20. Patient understands and accepts financial responsibility for any deductible, co-insurance and/or co-pays associated with this service.

## 2020-07-15 ENCOUNTER — OFFICE VISIT (OUTPATIENT)
Dept: NEUROLOGY | Facility: CLINIC | Age: 20
End: 2020-07-15
Payer: MEDICAID

## 2020-07-15 VITALS
HEART RATE: 86 BPM | BODY MASS INDEX: 28 KG/M2 | SYSTOLIC BLOOD PRESSURE: 104 MMHG | WEIGHT: 158 LBS | DIASTOLIC BLOOD PRESSURE: 64 MMHG | RESPIRATION RATE: 16 BRPM

## 2020-07-15 DIAGNOSIS — G40.909 SEIZURE DISORDER (HCC): Primary | ICD-10-CM

## 2020-07-15 PROCEDURE — 3074F SYST BP LT 130 MM HG: CPT | Performed by: OTHER

## 2020-07-15 PROCEDURE — 99213 OFFICE O/P EST LOW 20 MIN: CPT | Performed by: OTHER

## 2020-07-15 PROCEDURE — 3078F DIAST BP <80 MM HG: CPT | Performed by: OTHER

## 2020-07-15 RX ORDER — LEVETIRACETAM 500 MG/1
TABLET ORAL
Qty: 120 TABLET | Refills: 2 | Status: SHIPPED | OUTPATIENT
Start: 2020-07-15 | End: 2020-10-02

## 2020-07-15 NOTE — PROGRESS NOTES
Patient reports she has woken up recently with her the inside of her cheeks bitten up and has woken a few times and was shaking. Has  been taking Keppra consistently.

## 2020-07-15 NOTE — PROGRESS NOTES
HPI:    Patient ID: Padmini Stewart is a 23year old female. PCP: Dr Dagmar Mcghee    Seizures   Pertinent negatives include no headaches. 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   [place]ial is about 7 months pregna Normocephalic and atraumatic. Cardiovascular: Normal rate, regular rhythm and normal heart sounds. Pulmonary/Chest: Effort normal and breath sounds normal.   Abdominal: Soft. Bowel sounds are normal.   Skin: Skin is warm and dry.    Psychiatric:  petra

## 2020-10-02 DIAGNOSIS — G40.909 SEIZURE DISORDER (HCC): ICD-10-CM

## 2020-10-02 RX ORDER — LEVETIRACETAM 500 MG/1
TABLET ORAL
Qty: 150 TABLET | Refills: 0 | Status: SHIPPED | OUTPATIENT
Start: 2020-10-02 | End: 2020-11-18

## 2020-10-02 NOTE — TELEPHONE ENCOUNTER
Medication: Levetiracetam    Date of last refill: 7/15/20 (#120/2)  [place]     Last office visit: 7/15/2020  Due back to clinic per last office note:  4 months  Date next office visit scheduled:    Future Appointments

## 2020-11-17 DIAGNOSIS — G40.909 SEIZURE DISORDER (HCC): ICD-10-CM

## 2020-11-18 ENCOUNTER — OFFICE VISIT (OUTPATIENT)
Dept: NEUROLOGY | Facility: CLINIC | Age: 20
End: 2020-11-18
Payer: MEDICAID

## 2020-11-18 VITALS
RESPIRATION RATE: 16 BRPM | WEIGHT: 161 LBS | HEART RATE: 76 BPM | SYSTOLIC BLOOD PRESSURE: 132 MMHG | DIASTOLIC BLOOD PRESSURE: 72 MMHG | BODY MASS INDEX: 29 KG/M2

## 2020-11-18 DIAGNOSIS — G40.909 SEIZURE DISORDER (HCC): Primary | ICD-10-CM

## 2020-11-18 PROCEDURE — 99213 OFFICE O/P EST LOW 20 MIN: CPT | Performed by: OTHER

## 2020-11-18 PROCEDURE — 3075F SYST BP GE 130 - 139MM HG: CPT | Performed by: OTHER

## 2020-11-18 PROCEDURE — 3078F DIAST BP <80 MM HG: CPT | Performed by: OTHER

## 2020-11-18 RX ORDER — LEVETIRACETAM 500 MG/1
TABLET ORAL
Qty: 120 TABLET | Refills: 5 | Status: SHIPPED | OUTPATIENT
Start: 2020-11-18 | End: 2021-05-19

## 2020-11-18 RX ORDER — LEVETIRACETAM 500 MG/1
TABLET ORAL
Qty: 150 TABLET | Refills: 0 | OUTPATIENT
Start: 2020-11-18

## 2020-11-18 NOTE — PROGRESS NOTES
HPI:    Patient ID: Sharon Bueno is a 23year old female. PCP: Dr Guido Paul    Seizures  Pertinent negatives include no headaches. 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   Patient reports she is doing well every 6 (six) hours as needed for Pain. Allergies:No Known Allergies   PHYSICAL EXAM:   Physical Exam      Vitals reviewed  General: well developed, well nourished  HEENT: Normocephalic and atraumatic.    Cardiovascular: Normal rate, regular rhythm an

## 2020-11-18 NOTE — TELEPHONE ENCOUNTER
Spoke with the pharmacy who states they rec'd the prescription and to disregard the refill request.       Medication: LEVETIRACETAM 500 MG Oral Tab    Date of last refill: 11/18/2020 (#120/5)  [place]    Last office

## 2020-12-08 ENCOUNTER — TELEPHONE (OUTPATIENT)
Dept: NEUROLOGY | Facility: CLINIC | Age: 20
End: 2020-12-08

## 2021-05-19 ENCOUNTER — TELEMEDICINE (OUTPATIENT)
Dept: NEUROLOGY | Facility: CLINIC | Age: 21
End: 2021-05-19

## 2021-05-19 DIAGNOSIS — G40.909 SEIZURE DISORDER (HCC): Primary | ICD-10-CM

## 2021-05-19 PROCEDURE — 99213 OFFICE O/P EST LOW 20 MIN: CPT | Performed by: OTHER

## 2021-05-19 RX ORDER — LEVETIRACETAM 500 MG/1
TABLET ORAL
Qty: 120 TABLET | Refills: 5 | Status: SHIPPED | OUTPATIENT
Start: 2021-05-19 | End: 2021-11-29

## 2021-05-19 NOTE — PROGRESS NOTES
HPI:    Patient ID: Karyle Hof is a 23year old female. PCP: Dr Jessica Jackson    This visit is conducted using Telemedicine with live, interactive video and audio.     Patient has been referred to the [facility] website at [email or web address] to review the y Current Outpatient Medications   Medication Sig Dispense Refill   • levETIRAcetam 500 MG Oral Tab Take 2 tabs in AM and 2 tabs in  tablet 5   • ibuprofen 600 MG Oral Tab Take 600 mg by mouth every 6 (six) hours as needed for Pain.        Allergies:N

## 2021-10-06 ENCOUNTER — TELEPHONE (OUTPATIENT)
Dept: NEUROLOGY | Facility: CLINIC | Age: 21
End: 2021-10-06

## 2021-10-06 NOTE — TELEPHONE ENCOUNTER
pt dropped off  Services form to be completed; pt made earliest available appt 12/1/21; placed in nurse bin for completion

## 2021-10-15 NOTE — TELEPHONE ENCOUNTER
Form signed by provider. Pt requests mailed to her home. Placed in outgoing mail.     Copy sent to scanning

## 2021-11-29 DIAGNOSIS — G40.909 SEIZURE DISORDER (HCC): ICD-10-CM

## 2021-11-29 RX ORDER — LEVETIRACETAM 500 MG/1
TABLET ORAL
Qty: 120 TABLET | Refills: 0 | Status: SHIPPED | OUTPATIENT
Start: 2021-11-29 | End: 2021-12-01

## 2021-11-29 NOTE — TELEPHONE ENCOUNTER
Medication: LEVETIRACETAM 500 MG Oral Tab     Date of last refill: 5/19/21 (#120/0)  [place]     Last office visit: 5/19/21  Due back to clinic per last office note: Follow up in about 6 months.    Date next office v

## 2021-12-01 ENCOUNTER — OFFICE VISIT (OUTPATIENT)
Dept: NEUROLOGY | Facility: CLINIC | Age: 21
End: 2021-12-01
Payer: MEDICAID

## 2021-12-01 VITALS
DIASTOLIC BLOOD PRESSURE: 70 MMHG | SYSTOLIC BLOOD PRESSURE: 112 MMHG | BODY MASS INDEX: 27 KG/M2 | RESPIRATION RATE: 16 BRPM | WEIGHT: 153 LBS | HEART RATE: 62 BPM

## 2021-12-01 DIAGNOSIS — G40.909 SEIZURE DISORDER (HCC): Primary | ICD-10-CM

## 2021-12-01 PROCEDURE — 3074F SYST BP LT 130 MM HG: CPT | Performed by: OTHER

## 2021-12-01 PROCEDURE — 3078F DIAST BP <80 MM HG: CPT | Performed by: OTHER

## 2021-12-01 PROCEDURE — 99213 OFFICE O/P EST LOW 20 MIN: CPT | Performed by: OTHER

## 2021-12-01 RX ORDER — FLUTICASONE PROPIONATE 50 MCG
1 SPRAY, SUSPENSION (ML) NASAL AS NEEDED
COMMUNITY
Start: 2021-07-21

## 2021-12-01 RX ORDER — LEVETIRACETAM 500 MG/1
TABLET ORAL
Qty: 120 TABLET | Refills: 5 | Status: SHIPPED | OUTPATIENT
Start: 2021-12-01

## 2021-12-01 NOTE — PROGRESS NOTES
HPI:    Patient ID: Niall Shane is a 19year old female. HPI  Patient is a 68-year-old female with history of seizure disorder and migraines. Patient reports she is doing well seizure wise.   She is currently on Keppra 1000 mg twice a daily and th like lesion just above left eye brow  Cardiovascular: Normal rate, regular rhythm and normal heart sounds. Pulmonary/Chest: Effort normal and breath sounds normal.   Abdominal: Soft.  Bowel sounds are normal.   Musculoskeletal: + Phalen's signs at both w

## 2022-01-04 DIAGNOSIS — G40.909 SEIZURE DISORDER (HCC): ICD-10-CM

## 2022-01-04 NOTE — TELEPHONE ENCOUNTER
Spoke with the pharmacist who states to disregard. Patient has refills remaining.        Medication: LEVETIRACETAM 500 MG Oral Tab     Date of last refill: 12/01/2021 (#120/5)  [place]     Last office visit: 12/01/202

## 2022-01-05 RX ORDER — LEVETIRACETAM 500 MG/1
TABLET ORAL
Qty: 120 TABLET | Refills: 5 | OUTPATIENT
Start: 2022-01-05

## 2022-05-12 ENCOUNTER — OFFICE VISIT (OUTPATIENT)
Dept: NEUROLOGY | Facility: CLINIC | Age: 22
End: 2022-05-12
Payer: MEDICAID

## 2022-05-12 ENCOUNTER — TELEPHONE (OUTPATIENT)
Dept: NEUROLOGY | Facility: CLINIC | Age: 22
End: 2022-05-12

## 2022-05-12 VITALS
BODY MASS INDEX: 26 KG/M2 | WEIGHT: 148.19 LBS | HEART RATE: 72 BPM | SYSTOLIC BLOOD PRESSURE: 100 MMHG | RESPIRATION RATE: 16 BRPM | DIASTOLIC BLOOD PRESSURE: 62 MMHG

## 2022-05-12 DIAGNOSIS — G40.909 SEIZURE DISORDER (HCC): Primary | ICD-10-CM

## 2022-05-12 DIAGNOSIS — G43.009 MIGRAINE WITHOUT AURA AND WITHOUT STATUS MIGRAINOSUS, NOT INTRACTABLE: ICD-10-CM

## 2022-05-12 DIAGNOSIS — R20.2 NUMBNESS AND TINGLING OF RIGHT LEG: ICD-10-CM

## 2022-05-12 DIAGNOSIS — R20.0 NUMBNESS AND TINGLING OF RIGHT LEG: ICD-10-CM

## 2022-05-12 PROCEDURE — 3078F DIAST BP <80 MM HG: CPT | Performed by: OTHER

## 2022-05-12 PROCEDURE — 99213 OFFICE O/P EST LOW 20 MIN: CPT | Performed by: OTHER

## 2022-05-12 PROCEDURE — 3074F SYST BP LT 130 MM HG: CPT | Performed by: OTHER

## 2022-05-12 RX ORDER — ALBUTEROL SULFATE 90 UG/1
1 AEROSOL, METERED RESPIRATORY (INHALATION) EVERY 4 HOURS PRN
COMMUNITY
Start: 2022-01-05

## 2022-05-12 RX ORDER — ESCITALOPRAM OXALATE 10 MG/1
10 TABLET ORAL DAILY
Qty: 10 TABLET | Refills: 2 | Status: SHIPPED | OUTPATIENT
Start: 2022-05-12

## 2022-05-12 RX ORDER — RIZATRIPTAN BENZOATE 10 MG/1
10 TABLET ORAL AS NEEDED
Qty: 10 TABLET | Refills: 2 | Status: SHIPPED | OUTPATIENT
Start: 2022-05-12

## 2022-05-12 NOTE — PROGRESS NOTES
Patient states she fainted at work on Friday and paramedics called. Right leg got very stiff and could not move her right leg or arm. Had constant headache for 2 weeks prior to fainting episode. No known seizures since LOV.

## 2022-05-12 NOTE — TELEPHONE ENCOUNTER
MRI SPINE LUMBAR (CPT=72148)    Status: pending approval     [a][employer], to initiate authorization for request.   Case# [id number]     Clinical notes sent to [facility] for clinical review.

## 2022-05-17 NOTE — TELEPHONE ENCOUNTER
MRI SPINE LUMBAR (CPT=72148)- DENIED    The reason is: You are allotted an additional seven calendar days from the notification of adverse  determination to complete a [id number] discussion with a Medical Director (by calling [phone], select Option 4). Reference number:[id number]    This must be completed within 7 calendar days    Notified Dr. Blanca Almonte for further options. A copy of the denial letter was sent to [employer].

## 2022-06-02 DIAGNOSIS — G40.909 SEIZURE DISORDER (HCC): ICD-10-CM

## 2022-06-02 RX ORDER — ESCITALOPRAM OXALATE 10 MG/1
TABLET ORAL
Qty: 30 TABLET | Refills: 2 | Status: SHIPPED | OUTPATIENT
Start: 2022-06-02

## 2022-06-02 RX ORDER — LEVETIRACETAM 500 MG/1
TABLET ORAL
Qty: 120 TABLET | Refills: 2 | Status: SHIPPED | OUTPATIENT
Start: 2022-06-02

## 2022-08-12 DIAGNOSIS — G40.909 SEIZURE DISORDER (HCC): ICD-10-CM

## 2022-08-12 RX ORDER — ESCITALOPRAM OXALATE 10 MG/1
TABLET ORAL
Qty: 30 TABLET | Refills: 2 | Status: SHIPPED | OUTPATIENT
Start: 2022-08-12

## 2022-09-02 DIAGNOSIS — G40.909 SEIZURE DISORDER (HCC): ICD-10-CM

## 2022-09-02 RX ORDER — LEVETIRACETAM 500 MG/1
TABLET ORAL
Qty: 120 TABLET | Refills: 2 | Status: SHIPPED | OUTPATIENT
Start: 2022-09-02

## 2022-09-30 DIAGNOSIS — G40.909 SEIZURE DISORDER (HCC): ICD-10-CM

## 2022-09-30 RX ORDER — LEVETIRACETAM 500 MG/1
TABLET ORAL
Qty: 120 TABLET | Refills: 2 | OUTPATIENT
Start: 2022-09-30

## 2022-11-08 DIAGNOSIS — G40.909 SEIZURE DISORDER (HCC): ICD-10-CM

## 2022-11-08 RX ORDER — ESCITALOPRAM OXALATE 10 MG/1
TABLET ORAL
Qty: 30 TABLET | Refills: 2 | Status: SHIPPED | OUTPATIENT
Start: 2022-11-08

## 2022-11-29 DIAGNOSIS — G40.909 SEIZURE DISORDER (HCC): ICD-10-CM

## 2022-11-29 RX ORDER — LEVETIRACETAM 500 MG/1
TABLET ORAL
Qty: 120 TABLET | Refills: 2 | Status: SHIPPED | OUTPATIENT
Start: 2022-11-29

## 2023-03-01 DIAGNOSIS — G40.909 SEIZURE DISORDER (HCC): ICD-10-CM

## 2023-03-01 RX ORDER — LEVETIRACETAM 500 MG/1
TABLET ORAL
Qty: 120 TABLET | Refills: 2 | Status: SHIPPED | OUTPATIENT
Start: 2023-03-01

## 2023-03-06 DIAGNOSIS — G40.909 SEIZURE DISORDER (HCC): ICD-10-CM

## 2023-03-10 NOTE — TELEPHONE ENCOUNTER
----- Message from Vasu Haynes MD sent at 6/20/2021 12:20 PM EDT -----    Blood work showed LDL improved  Triglyceride improved slightly  Her GFR has been stable  Continue to encourage oral hydration  I will discuss further in her next appointment  Pt scheduled follow up visit for 6/2/23

## 2023-03-15 RX ORDER — ESCITALOPRAM OXALATE 10 MG/1
TABLET ORAL
Qty: 30 TABLET | Refills: 2 | Status: SHIPPED | OUTPATIENT
Start: 2023-03-15

## 2023-05-16 DIAGNOSIS — G40.909 SEIZURE DISORDER (HCC): ICD-10-CM

## 2023-05-16 RX ORDER — ESCITALOPRAM OXALATE 10 MG/1
TABLET ORAL
Qty: 30 TABLET | Refills: 2 | Status: SHIPPED | OUTPATIENT
Start: 2023-05-16

## 2023-05-28 DIAGNOSIS — G40.909 SEIZURE DISORDER (HCC): ICD-10-CM

## 2023-05-30 RX ORDER — LEVETIRACETAM 500 MG/1
TABLET ORAL
Qty: 120 TABLET | Refills: 0 | Status: SHIPPED | OUTPATIENT
Start: 2023-05-30

## 2023-07-06 DIAGNOSIS — G40.909 SEIZURE DISORDER (HCC): ICD-10-CM

## 2023-07-06 RX ORDER — LEVETIRACETAM 500 MG/1
1000 TABLET ORAL 2 TIMES DAILY
Qty: 120 TABLET | Refills: 0 | Status: SHIPPED | OUTPATIENT
Start: 2023-07-06

## 2023-07-06 NOTE — TELEPHONE ENCOUNTER
Sent the patient a [employer] message to make an appt for further medication refills. Patient no showed her last appt. Patient has not been seen in over  year. Medication: LEVETIRACETAM 500 MG TABLETS     Date of last refill: 05/30/2023 (#120/0)  [place]     Last office visit: 05/12/2022  Due back to clinic per last office note:  Around 08/12/2022  Date next office visit scheduled:    No future appointments. Last OV note recommendation:    (G40.909) Seizure disorder ([facility] [address]  (primary encounter diagnosis)     (R20.0,  R20.2) Numbness and tingling of right leg  Plan: MRI SPINE LUMBAR (CPT=72148)         (G43.009) Migraine without aura and without status migrainosus, not intractable           Episode of near syncope/syncope. 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 Continue Keppra 1000 mg twice daily. Less likely the above episode was seizure  Monitor your symptoms, hydrate well. Advised to see OBGYN for menorrhagia- patient c/o tiredness and has mild anemia     2. Obtain MRI lumbar spine for possible right lumbar radiculopathy     3. Take Rizatriptan as needed for migraine headaches     Follow up in about 3-4 months     See orders and medications filed with this encounter. The patient indicates understanding of these issues and agrees with the plan.

## 2023-07-30 DIAGNOSIS — G40.909 SEIZURE DISORDER (HCC): ICD-10-CM

## 2023-07-31 NOTE — TELEPHONE ENCOUNTER
Pt was last seen 05/12/22 needed 3 month follow up no appt has been made    Medication: LEVETIRACETAM 500 MG Oral Tab     Date of last refill: 07/06/23 (120/0)  [place] 27/94/36    Last office visit: 05/12/22  Due back to clinic per last office note:  3 months  Date next office visit scheduled:  No future appointments. Last OV note recommendation:     PLAN:         Episode of near syncope/syncope. 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 Continue Keppra 1000 mg twice daily. Less likely the above episode was seizure  Monitor your symptoms, hydrate well. Advised to see OBGYN for menorrhagia- patient c/o tiredness and has mild anemia     2. Obtain MRI lumbar spine for possible right lumbar radiculopathy     3.  Take Rizatriptan as needed for migraine headaches     Follow up in about 3-4 months

## 2023-08-01 RX ORDER — LEVETIRACETAM 500 MG/1
1000 TABLET ORAL 2 TIMES DAILY
Qty: 120 TABLET | Refills: 3 | Status: SHIPPED | OUTPATIENT
Start: 2023-08-01

## 2023-09-22 ENCOUNTER — TELEPHONE (OUTPATIENT)
Dept: NEUROLOGY | Facility: CLINIC | Age: 23
End: 2023-09-22

## 2023-09-22 DIAGNOSIS — G40.909 SEIZURE DISORDER (HCC): ICD-10-CM

## 2023-09-22 RX ORDER — ESCITALOPRAM OXALATE 10 MG/1
10 TABLET ORAL DAILY
Qty: 30 TABLET | Refills: 2 | Status: SHIPPED | OUTPATIENT
Start: 2023-09-22

## 2023-09-22 NOTE — TELEPHONE ENCOUNTER
Patient was advised she need to be seen. Medication: escitalopram 10 mg     Date of last refill: 05/16/2023 (#30/2)  [place]     Last office visit: 05/12/2022  Due back to clinic per last office note:  3 months  Date next office visit scheduled:    Future Appointments   Date Time Provider Yoselyn Jiménez   1/8/2024 10:10 AM Riya Fisher MD [facility]           Last OV note recommendation:    Episode of near syncope/syncope. 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 Continue Keppra 1000 mg twice daily. Less likely the above episode was seizure  Monitor your symptoms, hydrate well. Advised to see OBGYN for menorrhagia- patient c/o tiredness and has mild anemia     2. Obtain MRI lumbar spine for possible right lumbar radiculopathy     3.  Take Rizatriptan as needed for migraine headaches     Follow up in about 3-4 months

## 2023-09-22 NOTE — TELEPHONE ENCOUNTER
Pt scheduled first available f/u appt 1/8/24. Pt states [facility] is waiting for Provider's approval for new anxiety medication. Please advise, Pt's best call back number is [phone]. Endorsed to RN for Provider.

## 2023-12-26 DIAGNOSIS — G40.909 SEIZURE DISORDER (HCC): ICD-10-CM

## 2023-12-26 RX ORDER — LEVETIRACETAM 500 MG/1
1000 TABLET ORAL 2 TIMES DAILY
Qty: 120 TABLET | Refills: 0 | Status: SHIPPED | OUTPATIENT
Start: 2023-12-26

## 2023-12-26 RX ORDER — LEVETIRACETAM 500 MG/1
2 TABLET ORAL 2 TIMES DAILY
Qty: 360 TABLET | Refills: 0 | OUTPATIENT
Start: 2023-12-26

## 2023-12-26 NOTE — TELEPHONE ENCOUNTER
Refused 90 day request. Patient needs to be seen to received more then 30 days.    Future Appointments   Date Time Provider Yoselyn Jiménez   1/8/2024 10:10 AM MD MICHELLE Blair

## 2023-12-26 NOTE — TELEPHONE ENCOUNTER
Medication: Levetiracetam 500 mg     Date of last refill: 08/01/2023 (#120/3)  [place]     Last office visit: 05/12/2022  Due back to clinic per last office note:  3 months  Date next office visit scheduled:    Future Appointments   Date Time Provider Yoselyn Youssefi   1/8/2024 10:10 AM Matt Mc MD [facility]           Last OV note recommendation:      Episode of near syncope/syncope. 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 Continue Keppra 1000 mg twice daily. Less likely the above episode was seizure  Monitor your symptoms, hydrate well. Advised to see OBGYN for menorrhagia- patient c/o tiredness and has mild anemia     2. Obtain MRI lumbar spine for possible right lumbar radiculopathy     3.  Take Rizatriptan as needed for migraine headaches     Follow up in about 3-4 months

## 2024-01-09 DIAGNOSIS — G40.909 SEIZURE DISORDER (HCC): ICD-10-CM

## 2024-01-09 RX ORDER — ESCITALOPRAM OXALATE 10 MG/1
10 TABLET ORAL DAILY
Qty: 30 TABLET | Refills: 0 | Status: SHIPPED | OUTPATIENT
Start: 2024-01-09 | End: 2024-02-09

## 2024-01-09 NOTE — TELEPHONE ENCOUNTER
Unable to leave a voicemail. No voicemail box and phone kept ringing. Patient is overdue for an office visit and needs to be seen for further medication refills. Patient does not check [employer] messages. Patient was a no show to appt 01/08/2024 and no showed in 06/2023.       Medication: ESCITALOPRAM 10 MG Oral Tab      Date of last refill: 09/22/2023 (#30/2)  [place]     Last office visit: 05/12/2022  Due back to clinic per last office note:  Around 08/12/2022  Date next office visit scheduled:    No future appointments.        Last OV note recommendation:    ASSESSMENT/PLAN:      (G40.909) Seizure disorder (HCC)  (primary encounter diagnosis)     (R20.0,  R20.2) Numbness and tingling of right leg  Plan: MRI SPINE LUMBAR (CPT=72148)         (G43.009) Migraine without aura and without status migrainosus, not intractable           Episode of near syncope/syncope. 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 Continue Keppra 1000 mg twice daily. Less likely the above episode was seizure  Monitor your symptoms, hydrate well.   Advised to see OBGYN for menorrhagia- patient c/o tiredness and has mild anemia     2. Obtain MRI lumbar spine for possible right lumbar radiculopathy     3. Take Rizatriptan as needed for migraine headaches     Follow up in about 3-4 months     See orders and medications filed with this encounter. The patient indicates understanding of these issues and agrees with the plan.

## 2024-02-03 DIAGNOSIS — G40.909 SEIZURE DISORDER (HCC): ICD-10-CM

## 2024-02-05 RX ORDER — ESCITALOPRAM OXALATE 10 MG/1
10 TABLET ORAL DAILY
Qty: 30 TABLET | Refills: 0 | OUTPATIENT
Start: 2024-02-05

## 2024-02-05 NOTE — TELEPHONE ENCOUNTER
Refused. Patient has not been seen in over a year, patient phone keeps ringing. Unable to leave voicemail. Patient does not answer [employer] message. Will refuse medication. Patient needs to be seen in order for future refills.         Medication: ESCITALOPRAM 10 MG Oral Tab      Date of last refill: 01/09/2024 (#30/0)  [place]     Last office visit: 05/12/2022  Due back to clinic per last office note:  Around 08/12/2022  Date next office visit scheduled:    No future appointments.        Last OV note recommendation:    ASSESSMENT/PLAN:      (G40.909) Seizure disorder (HCC)  (primary encounter diagnosis)     (R20.0,  R20.2) Numbness and tingling of right leg  Plan: MRI SPINE LUMBAR (CPT=72148)         (G43.009) Migraine without aura and without status migrainosus, not intractable           Episode of near syncope/syncope. 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 Continue Keppra 1000 mg twice daily. Less likely the above episode was seizure  Monitor your symptoms, hydrate well.   Advised to see OBGYN for menorrhagia- patient c/o tiredness and has mild anemia     2. Obtain MRI lumbar spine for possible right lumbar radiculopathy     3. Take Rizatriptan as needed for migraine headaches     Follow up in about 3-4 months     See orders and medications filed with this encounter. The patient indicates understanding of these issues and agrees with the plan.

## 2024-02-09 DIAGNOSIS — G40.909 SEIZURE DISORDER (HCC): ICD-10-CM

## 2024-02-09 RX ORDER — LEVETIRACETAM 500 MG/1
1000 TABLET ORAL 2 TIMES DAILY
Qty: 60 TABLET | Refills: 0 | Status: SHIPPED | OUTPATIENT
Start: 2024-02-09

## 2024-02-09 RX ORDER — ESCITALOPRAM OXALATE 10 MG/1
10 TABLET ORAL DAILY
Qty: 30 TABLET | Refills: 0 | Status: SHIPPED | OUTPATIENT
Start: 2024-02-09

## 2024-02-09 NOTE — TELEPHONE ENCOUNTER
Medication: escitalopram 10 MG Oral Tab + levETIRAcetam 500 MG Oral Tab     Date of last refill: 01/09/2024 (#30/0) + 01/26/2024 (#60/0)  [place]     Last office visit: 05/12/2022  Due back to clinic per last office note:  Around 08/12/2022  Date next office visit scheduled:    Future Appointments   Date Time Provider Department Center   4/29/2024  2:50 PM Arelis Boykin MD [facility]           Last OV note recommendation:    ASSESSMENT/PLAN:      (G40.909) Seizure disorder (HCC)  (primary encounter diagnosis)     (R20.0,  R20.2) Numbness and tingling of right leg  Plan: MRI SPINE LUMBAR (CPT=72148)         (G43.009) Migraine without aura and without status migrainosus, not intractable           Episode of near syncope/syncope. 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 Continue Keppra 1000 mg twice daily. Less likely the above episode was seizure  Monitor your symptoms, hydrate well.   Advised to see OBGYN for menorrhagia- patient c/o tiredness and has mild anemia     2. Obtain MRI lumbar spine for possible right lumbar radiculopathy     3. Take Rizatriptan as needed for migraine headaches     Follow up in about 3-4 months     See orders and medications filed with this encounter. The patient indicates understanding of these issues and agrees with the plan.                 Arelis Boykin MD  [facility]

## 2024-02-26 DIAGNOSIS — G40.909 SEIZURE DISORDER (HCC): ICD-10-CM

## 2024-02-27 RX ORDER — ESCITALOPRAM OXALATE 10 MG/1
10 TABLET ORAL DAILY
Qty: 30 TABLET | Refills: 0 | Status: SHIPPED | OUTPATIENT
Start: 2024-02-27

## 2024-02-27 RX ORDER — LEVETIRACETAM 500 MG/1
2 TABLET ORAL 2 TIMES DAILY
Qty: 60 TABLET | Refills: 0 | OUTPATIENT
Start: 2024-02-27

## 2024-02-27 RX ORDER — LEVETIRACETAM 500 MG/1
1000 TABLET ORAL 2 TIMES DAILY
Qty: 60 TABLET | Refills: 0 | Status: SHIPPED | OUTPATIENT
Start: 2024-02-27

## 2024-02-27 NOTE — TELEPHONE ENCOUNTER
Medication: levETIRAcetam 500 MG Oral Tab +  escitalopram 10 MG Oral Tab     Date of last refill: 02/09/2024 (#60/0) + 02/09/2024 (#30/0)  [place]     Last office visit: 05/12/2022  Due back to clinic per last office note:  AROUND 3 MONTHS  Date next office visit scheduled:    Future Appointments   Date Time Provider Department Center   4/29/2024  2:50 PM Arelis Boykin MD [facility]           Last OV note recommendation:    ASSESSMENT/PLAN:      (G40.909) Seizure disorder (HCC)  (primary encounter diagnosis)     (R20.0,  R20.2) Numbness and tingling of right leg  Plan: MRI SPINE LUMBAR (CPT=72148)         (G43.009) Migraine without aura and without status migrainosus, not intractable           Episode of near syncope/syncope. 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 Continue Keppra 1000 mg twice daily. Less likely the above episode was seizure  Monitor your symptoms, hydrate well.   Advised to see OBGYN for menorrhagia- patient c/o tiredness and has mild anemia     2. Obtain MRI lumbar spine for possible right lumbar radiculopathy     3. Take Rizatriptan as needed for migraine headaches     Follow up in about 3-4 months     See orders and medications filed with this encounter. The patient indicates understanding of these issues and agrees with the plan.

## 2024-02-27 NOTE — TELEPHONE ENCOUNTER
Medication refused due to duplicate request      Medication: LEVETIRACETAM 500 MG Oral Tab      Date of last refill: 02/09/2024 (#60/0)  [place]     Last office visit: 05/12/2022  Due back to clinic per last office note:  around 8/12/2022  Date next office visit scheduled:    Future Appointments   Date Time Provider Department Center   4/29/2024  2:50 PM Arelis Boykin MD [facility]           Last OV note recommendation:    ASSESSMENT/PLAN:      (G40.909) Seizure disorder (HCC)  (primary encounter diagnosis)     (R20.0,  R20.2) Numbness and tingling of right leg  Plan: MRI SPINE LUMBAR (CPT=72148)         (G43.009) Migraine without aura and without status migrainosus, not intractable           Episode of near syncope/syncope. 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 Continue Keppra 1000 mg twice daily. Less likely the above episode was seizure  Monitor your symptoms, hydrate well.   Advised to see OBGYN for menorrhagia- patient c/o tiredness and has mild anemia     2. Obtain MRI lumbar spine for possible right lumbar radiculopathy     3. Take Rizatriptan as needed for migraine headaches     Follow up in about 3-4 months     See orders and medications filed with this encounter. The patient indicates understanding of these issues and agrees with the plan.

## 2024-03-15 DIAGNOSIS — G40.909 SEIZURE DISORDER (HCC): ICD-10-CM

## 2024-03-16 DIAGNOSIS — G40.909 SEIZURE DISORDER (HCC): ICD-10-CM

## 2024-03-18 RX ORDER — LEVETIRACETAM 500 MG/1
1000 TABLET ORAL 2 TIMES DAILY
Qty: 60 TABLET | Refills: 1 | Status: SHIPPED | OUTPATIENT
Start: 2024-03-18

## 2024-03-18 RX ORDER — LEVETIRACETAM 500 MG/1
2 TABLET ORAL 2 TIMES DAILY
Qty: 60 TABLET | Refills: 0 | OUTPATIENT
Start: 2024-03-18

## 2024-03-18 NOTE — TELEPHONE ENCOUNTER
Duplicate request received. Patient will need to keep her follow up to continue to receive refills.

## 2024-03-18 NOTE — TELEPHONE ENCOUNTER
Medication: Levetiracetam     Date of last refill: 02/27/2024 (#60/0)  [place]     Last office visit: 05/12/2022  Due back to clinic per last office note:  3 months  Date next office visit scheduled:    Future Appointments   Date Time Provider Department Center   4/29/2024  2:50 PM Arelis Boykin MD [facility]           Last OV note recommendation:    Episode of near syncope/syncope. 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 Continue Keppra 1000 mg twice daily. Less likely the above episode was seizure  Monitor your symptoms, hydrate well.   Advised to see OBGYN for menorrhagia- patient c/o tiredness and has mild anemia     2. Obtain MRI lumbar spine for possible right lumbar radiculopathy     3. Take Rizatriptan as needed for migraine headaches     Follow up in about 3-4 months

## 2024-04-08 DIAGNOSIS — G40.909 SEIZURE DISORDER (HCC): ICD-10-CM

## 2024-04-08 RX ORDER — ESCITALOPRAM OXALATE 10 MG/1
10 TABLET ORAL DAILY
Qty: 30 TABLET | Refills: 0 | Status: SHIPPED | OUTPATIENT
Start: 2024-04-08 | End: 2024-04-29

## 2024-04-08 NOTE — TELEPHONE ENCOUNTER
Medication: ESCITALOPRAM 10 MG Oral Tab      Date of last refill: 02/27/2024 (#30/0)  [place]     Last office visit: 05/12/2022  Due back to clinic per last office note:  Around 08/12/2022  Date next office visit scheduled:    Future Appointments   Date Time Provider Department Center   4/29/2024  2:50 PM Arelis Boykin MD [facility]           Last OV note recommendation:    ASSESSMENT/PLAN:      (G40.909) Seizure disorder (HCC)  (primary encounter diagnosis)     (R20.0,  R20.2) Numbness and tingling of right leg  Plan: MRI SPINE LUMBAR (CPT=72148)         (G43.009) Migraine without aura and without status migrainosus, not intractable           Episode of near syncope/syncope. 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 Continue Keppra 1000 mg twice daily. Less likely the above episode was seizure  Monitor your symptoms, hydrate well.   Advised to see OBGYN for menorrhagia- patient c/o tiredness and has mild anemia     2. Obtain MRI lumbar spine for possible right lumbar radiculopathy     3. Take Rizatriptan as needed for migraine headaches     Follow up in about 3-4 months     See orders and medications filed with this encounter. The patient indicates understanding of these issues and agrees with the plan.                 Arelis Boykin MD  [facility]

## 2024-04-14 DIAGNOSIS — G40.909 SEIZURE DISORDER (HCC): ICD-10-CM

## 2024-04-16 DIAGNOSIS — G40.909 SEIZURE DISORDER (HCC): ICD-10-CM

## 2024-04-16 RX ORDER — LEVETIRACETAM 1000 MG/1
1000 TABLET ORAL 2 TIMES DAILY
Qty: 120 TABLET | Refills: 0 | Status: SHIPPED | OUTPATIENT
Start: 2024-04-16 | End: 2024-04-29

## 2024-04-16 NOTE — TELEPHONE ENCOUNTER
Medication: LEVETIRACETAM 500 MG Oral Tab      Date of last refill: 03/18/2024 (#60/0)  [place]     Last office visit: 05/12/2022  Due back to clinic per last office note:  3-4 months   Date next office visit scheduled:    Future Appointments   Date Time Provider Department Center   4/29/2024  2:50 PM Arelis Boykin MD [facility]           Last OV note recommendation:    ASSESSMENT/PLAN:      (G40.909) Seizure disorder (HCC)  (primary encounter diagnosis)     (R20.0,  R20.2) Numbness and tingling of right leg  Plan: MRI SPINE LUMBAR (CPT=72148)         (G43.009) Migraine without aura and without status migrainosus, not intractable           Episode of near syncope/syncope. 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 Continue Keppra 1000 mg twice daily. Less likely the above episode was seizure  Monitor your symptoms, hydrate well.   Advised to see OBGYN for menorrhagia- patient c/o tiredness and has mild anemia     2. Obtain MRI lumbar spine for possible right lumbar radiculopathy     3. Take Rizatriptan as needed for migraine headaches     Follow up in about 3-4 months     See orders and medications filed with this encounter. The patient indicates understanding of these issues and agrees with the plan.                 Arelis Boykin MD  [facility]

## 2024-04-18 RX ORDER — LEVETIRACETAM 1000 MG/1
1000 TABLET ORAL 2 TIMES DAILY
Qty: 180 TABLET | Refills: 0 | OUTPATIENT
Start: 2024-04-18

## 2024-04-18 NOTE — TELEPHONE ENCOUNTER
Refused 90 day supply, patient is overdue for follow-up, patient has an upcoming appointment on 04/29/2024.       Medication: LEVETIRACETAM 1000 MG Oral Tab      Date of last refill: 04/16/2024 (#120/0)  [place]     Last office visit: 05/12/2022  Due back to clinic per last office note:  Around 08/12/2022  Date next office visit scheduled:    Future Appointments   Date Time Provider Department Center   4/29/2024  2:50 PM Arelis Boykin MD [facility]           Last OV note recommendation:    ASSESSMENT/PLAN:      (G40.909) Seizure disorder (HCC)  (primary encounter diagnosis)     (R20.0,  R20.2) Numbness and tingling of right leg  Plan: MRI SPINE LUMBAR (CPT=72148)         (G43.009) Migraine without aura and without status migrainosus, not intractable           Episode of near syncope/syncope. 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 Continue Keppra 1000 mg twice daily. Less likely the above episode was seizure  Monitor your symptoms, hydrate well.   Advised to see OBGYN for menorrhagia- patient c/o tiredness and has mild anemia     2. Obtain MRI lumbar spine for possible right lumbar radiculopathy     3. Take Rizatriptan as needed for migraine headaches     Follow up in about 3-4 months     See orders and medications filed with this encounter. The patient indicates understanding of these issues and agrees with the plan.

## 2024-04-26 DIAGNOSIS — G40.909 SEIZURE DISORDER (HCC): ICD-10-CM

## 2024-04-29 ENCOUNTER — OFFICE VISIT (OUTPATIENT)
Dept: NEUROLOGY | Facility: CLINIC | Age: 24
End: 2024-04-29
Payer: MEDICAID

## 2024-04-29 VITALS
DIASTOLIC BLOOD PRESSURE: 70 MMHG | WEIGHT: 151.63 LBS | SYSTOLIC BLOOD PRESSURE: 106 MMHG | BODY MASS INDEX: 27 KG/M2 | RESPIRATION RATE: 16 BRPM | HEART RATE: 76 BPM

## 2024-04-29 DIAGNOSIS — G40.909 SEIZURE DISORDER (HCC): Primary | ICD-10-CM

## 2024-04-29 PROCEDURE — 99214 OFFICE O/P EST MOD 30 MIN: CPT | Performed by: OTHER

## 2024-04-29 RX ORDER — LEVETIRACETAM 1000 MG/1
1000 TABLET ORAL 2 TIMES DAILY
Qty: 180 TABLET | Refills: 3 | Status: SHIPPED | OUTPATIENT
Start: 2024-04-29

## 2024-04-29 RX ORDER — ESCITALOPRAM OXALATE 10 MG/1
10 TABLET ORAL DAILY
Qty: 30 TABLET | Refills: 0 | OUTPATIENT
Start: 2024-04-29

## 2024-04-29 RX ORDER — ESCITALOPRAM OXALATE 10 MG/1
10 TABLET ORAL DAILY
Qty: 30 TABLET | Refills: 11 | Status: SHIPPED | OUTPATIENT
Start: 2024-04-29

## 2024-04-29 NOTE — TELEPHONE ENCOUNTER
Medication: ESCITALOPRAM 10 MG Oral Tab      Date of last refill: 04/08/2024 (#30/0)  [place]     Last office visit: 05/12/2022  Due back to clinic per last office note:  3-4 months  Date next office visit scheduled:    Future Appointments   Date Time Provider Department Center   4/29/2024  2:50 PM Arelis Boykin MD [facility]           Last OV note recommendation:    ASSESSMENT/PLAN:      (G40.909) Seizure disorder (HCC)  (primary encounter diagnosis)     (R20.0,  R20.2) Numbness and tingling of right leg  Plan: MRI SPINE LUMBAR (CPT=72148)         (G43.009) Migraine without aura and without status migrainosus, not intractable           Episode of near syncope/syncope. 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 Continue Keppra 1000 mg twice daily. Less likely the above episode was seizure  Monitor your symptoms, hydrate well.   Advised to see OBGYN for menorrhagia- patient c/o tiredness and has mild anemia     2. Obtain MRI lumbar spine for possible right lumbar radiculopathy     3. Take Rizatriptan as needed for migraine headaches     Follow up in about 3-4 months     See orders and medications filed with this encounter. The patient indicates understanding of these issues and agrees with the plan.                 Arelis Boykin MD  [facility]

## 2024-04-29 NOTE — PATIENT INSTRUCTIONS
Refill policies:    Allow 2-3 business days for refills; controlled substances may take longer.  Contact your pharmacy at least 5 days prior to running out of medication and have them send an electronic request or submit request through the “request refill” option in your [employer] account.  Refills are not addressed on weekends; covering physicians do not authorize routine medications on weekends.  No narcotics or controlled substances are refilled after noon on Fridays or by on call physicians.  By law, narcotics must be electronically prescribed.  A 30 day supply with no refills is the maximum allowed.  If your prescription is due for a refill, you may be due for a follow up appointment.  To best provide you care, patients receiving routine medications need to be seen at least once a year.  Patients receiving narcotic/controlled substance medications need to be seen at least once every 3 months.  In the event that your preferred pharmacy does not have the requested medication in stock (e.g. Backordered), it is your responsibility to find another pharmacy that has the requested medication available.  We will gladly send a new prescription to that pharmacy at your request.    Scheduling Tests:    If your physician has ordered radiology tests such as MRI or CT scans, please contact Central Scheduling at [phone] right away to schedule the test.  Once scheduled, the [facility] Centralized Referral Team will work with your insurance carrier to obtain pre-certification or prior authorization.  Depending on your insurance carrier, approval may take 3-10 days.  It is highly recommended patients assure they have received an authorization before having a test performed.  If test is done without insurance authorization, patient may be responsible for the entire amount billed.      Precertification and Prior Authorizations:  If your physician has recommended that you have a procedure or additional testing performed the [facility]  Centralized Referral Team will contact your insurance carrier to obtain pre-certification or prior authorization.    You are strongly encouraged to contact your insurance carrier to verify that your procedure/test has been approved and is a COVERED benefit.  Although the [facility] Centralized Referral Team does its due diligence, the insurance carrier gives the disclaimer that \"Although the procedure is authorized, this does not guarantee payment.\"    Ultimately the patient is responsible for payment.   Thank you for your understanding in this matter.  Paperwork Completion:  If you require FMLA or disability paperwork for your recovery, please make sure to either drop it off or have it faxed to our office at [phone]. Be sure the form has your name and date of birth on it.  The form will be faxed to our Forms Department and they will complete it for you.  There is a 25$ fee for all forms that need to be filled out.  Please be aware there is a 10-14 day turnaround time.  You will need to sign a release of information (DOUG) form if your paperwork does not come with one.  You may call the Forms Department with any questions at [phone].  Their fax number is [phone].

## 2024-04-29 NOTE — PROGRESS NOTES
HPI:    Patient ID: Ondina Burnett is a 23 year old female.    Seizures  Associated symptoms include headaches.       Patient is a 23-year-old female who presented for follow-up for seizure disorder and migraines.  She was last seen in the clinic of May 2022.  States she is doing well and had no seizures or aura symptoms.  Headaches are stable.  She denies any further episodes of syncope. States missed appointment due to her work schedule        Ondina Burnett is a 21-year-old female with history of seizure disorder secondary to neurocysticercosis migraines who presented for follow-up and with complaints of a syncopal episode.  Patient reports she had a COVID infection in February 2022 and since then has been getting frequent migraine.  About 2 weeks ago she has been getting constant right-sided migraine-like headaches. On Friday at work felt dizzy lightheaded and right-sided started tingling and then she probably passed out.  There is no convulsive activity however patient felt right side was stiff. She was taken to [facility] and got IV fluids. States her BP and lab work was ok.  She also had a CT head which showed old calcifications but no new findings.  She said that she had no further episode but did continue to feel tired and intermittently dizzy and still getting headaches. She further reports that since she got tubal ligation she has been having tingling and numbness in right leg and low back pain. She think she might have a nerve injury or some complication from tubal ligation procedure       HISTORY:  Past Medical History:    Asthma (HCC)    Migraines    Seizure disorder (HCC)      Past Surgical History:   Procedure Laterality Date    Tubal ligation  08/25/2020    Umbilical hernia repair  2000      Family History   Problem Relation Age of Onset    Diabetes Maternal Grandmother       Social History     Socioeconomic History    Marital status: Single   Tobacco Use    Smoking status: Never     Smokeless tobacco: Never   Vaping Use    Vaping status: Never Used   Substance and Sexual Activity    Alcohol use: Not Currently    Drug use: Never   Other Topics Concern    Caffeine Concern Yes     Comment: coffee daily    Exercise Yes     Comment: active at work     Social Determinants of Health      [facility]    Social Connections    [facility]    Housing Stability        Review of Systems   Constitutional: Negative.    HENT: Negative.     Eyes: Negative.    Respiratory: Negative.     Cardiovascular: Negative.    Endocrine: Negative.    Genitourinary: Negative.    Musculoskeletal: Negative.    Skin: Negative.    Allergic/Immunologic: Negative.    Neurological:  Positive for seizures and headaches.   Psychiatric/Behavioral: Negative.     All other systems reviewed and are negative.           Current Outpatient Medications   Medication Sig Dispense Refill    levetiracetam 1000 MG Oral Tab Take 1 tablet (1,000 mg total) by mouth 2 (two) times daily. 120 tablet 0    ESCITALOPRAM 10 MG Oral Tab TAKE 1 TABLET(10 MG) BY MOUTH DAILY 30 tablet 0    albuterol 108 (90 Base) MCG/ACT Inhalation Aero Soln Inhale 1 puff into the lungs every 4 (four) hours as needed.      fluticasone propionate 50 MCG/ACT Nasal Suspension 1 spray by Nasal route as needed.       Allergies:No Known Allergies  PHYSICAL EXAM:   Physical Exam    General Appearance: Well nourished, well developed, no apparent distress.     HEENT: Normocephalic and atraumatic. Small painless acne like lesion just above left eye brow  Cardiovascular: Normal rate, regular rhythm and normal heart sounds.    Pulmonary/Chest: Effort normal and breath sounds normal.   Abdominal: Soft. Bowel sounds are normal.     Neurological: Patient is awake, alert and oriented to person, place and time with normal memory, fund of knowledge,  attention/concentration and language.    Cranial Nerves: II: Visual acuity: normal  II: Visual fields: normal  III: Pupils: equal, round, reactive to light  III,IV,VI: Extra Ocular Movements: intact  V: Facial sensation: intact  VII: Facial strength: intact  VIII: Hearing: intact  IX: Palate: intact  XI: Shoulder shrug: intact  XII: Tongue movement: normal    Motor Exam: Normal tone. Strength is  5 out of 5 in all extremities bilaterally.      Sensory: Sensory examination is normal to light touch and pinprick     Coordination: grossly intact    Gait: normal casual gait         ASSESSMENT/PLAN:       ICD-10-CM    1. 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 Continue Keppra 1000 mg twice daily.    2. Take Rizatriptan as needed for migraine headaches    3. On Escitalopram 10 mg for anxiety/depression    Follow up in about 12 months    See orders and medications filed with this encounter. The patient indicates understanding of these issues and agrees with the plan.        Arelis Boykin MD  [facility]        Meds This Visit:  Requested Prescriptions      No prescriptions requested or ordered in this encounter       Imaging & Referrals:  None     ID#[id number]

## 2025-04-26 DIAGNOSIS — G40.909 SEIZURE DISORDER (HCC): ICD-10-CM

## 2025-04-28 RX ORDER — ESCITALOPRAM OXALATE 10 MG/1
10 TABLET ORAL DAILY
Qty: 90 TABLET | Refills: 0 | Status: SHIPPED | OUTPATIENT
Start: 2025-04-28

## 2025-04-28 NOTE — TELEPHONE ENCOUNTER
Medication: ESCITALOPRAM 10 MG Oral Tab      Date of last refill: 04/29/2024 (#30/11)  [place]     Last office visit: 04/29/2024  Due back to clinic per last office note:  1 year  Date next office visit scheduled:    No future appointments.        Last OV note recommendation:    ASSESSMENT/PLAN:          ICD-10-CM     1. 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 Continue Keppra 1000 mg twice daily.     2. Take Rizatriptan as needed for migraine headaches     3. On Escitalopram 10 mg for anxiety/depression     Follow up in about 12 months     See orders and medications filed with this encounter. The patient indicates understanding of these issues and agrees with the plan.

## 2025-05-28 DIAGNOSIS — G40.909 SEIZURE DISORDER (HCC): ICD-10-CM

## 2025-05-29 RX ORDER — ESCITALOPRAM OXALATE 10 MG/1
10 TABLET ORAL DAILY
Qty: 90 TABLET | Refills: 0 | OUTPATIENT
Start: 2025-05-29

## 2025-05-29 NOTE — TELEPHONE ENCOUNTER
Spoke with the pharmacy who states that the patient has refill on file and to disregard the refill request.         Medication: ESCITALOPRAM 10 MG Oral Tab      Date of last refill: 04/28/2025 (#90/0)  [place]     Last office visit: 04/29/2024  Due back to clinic per last office note:  Around 04/29/2025  Date next office visit scheduled:    Future Appointments   Date Time Provider Department Center   5/30/2025  2:50 PM Arelis Boykin MD [facility]           Last OV note recommendation:    ASSESSMENT/PLAN:          ICD-10-CM     1. 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 Continue Keppra 1000 mg twice daily.     2. Take Rizatriptan as needed for migraine headaches     3. On Escitalopram 10 mg for anxiety/depression     Follow up in about 12 months     See orders and medications filed with this encounter. The patient indicates understanding of these issues and agrees with the plan.           Arelis Boykin MD  [facility]

## 2025-06-04 DIAGNOSIS — G40.909 SEIZURE DISORDER (HCC): ICD-10-CM

## 2025-06-05 DIAGNOSIS — G40.909 SEIZURE DISORDER (HCC): ICD-10-CM

## 2025-06-05 RX ORDER — LEVETIRACETAM 1000 MG/1
1000 TABLET ORAL 2 TIMES DAILY
Qty: 180 TABLET | Refills: 0 | Status: SHIPPED | OUTPATIENT
Start: 2025-06-05

## 2025-06-05 NOTE — TELEPHONE ENCOUNTER
Sent the patient a [facility] message to make an appointment for further medication refills.       Medication: LEVETIRACETAM 1000 MG Oral Tab      Date of last refill: 04/29/2024 (#180/3)  [place]     Last office visit: 04/29/2024  Due back to clinic per last office note:  Around 04/29/2025  Date next office visit scheduled:    No future appointments.        Last OV note recommendation:    ASSESSMENT/PLAN:          ICD-10-CM     1. 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 Continue Keppra 1000 mg twice daily.     2. Take Rizatriptan as needed for migraine headaches     3. On Escitalopram 10 mg for anxiety/depression     Follow up in about 12 months     See orders and medications filed with this encounter. The patient indicates understanding of these issues and agrees with the plan.

## 2025-06-06 RX ORDER — LEVETIRACETAM 1000 MG/1
1000 TABLET ORAL 2 TIMES DAILY
Qty: 180 TABLET | Refills: 0 | OUTPATIENT
Start: 2025-06-06

## 2025-06-06 NOTE — TELEPHONE ENCOUNTER
Duplicate request. Medication already sent to pharmacy yesterday.       Medication: LEVETIRACETAM 1000 MG Oral      Date of last refill: 6/5/2025 (#180/0)  [place]     Last office visit: 4/29/2024  Due back to clinic per last office note:  12 months  Date next office visit scheduled:    Future Appointments   Date Time Provider Department Center   8/4/2025  1:30 PM Arelis Boykin MD [facility]           Last OV note recommendation:    ASSESSMENT/PLAN:          ICD-10-CM     1. 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 Continue Keppra 1000 mg twice daily.     2. Take Rizatriptan as needed for migraine headaches     3. On Escitalopram 10 mg for anxiety/depression

## 2025-08-04 ENCOUNTER — OFFICE VISIT (OUTPATIENT)
Dept: NEUROLOGY | Facility: CLINIC | Age: 25
End: 2025-08-04

## 2025-08-04 VITALS
RESPIRATION RATE: 16 BRPM | WEIGHT: 155 LBS | DIASTOLIC BLOOD PRESSURE: 70 MMHG | BODY MASS INDEX: 27 KG/M2 | HEART RATE: 76 BPM | SYSTOLIC BLOOD PRESSURE: 124 MMHG

## 2025-08-04 DIAGNOSIS — G40.909 SEIZURE DISORDER (HCC): Primary | ICD-10-CM

## 2025-08-04 PROCEDURE — 99214 OFFICE O/P EST MOD 30 MIN: CPT | Performed by: OTHER

## 2025-08-04 RX ORDER — LEVETIRACETAM 500 MG/1
500 TABLET ORAL DAILY
Qty: 30 TABLET | Refills: 5 | Status: SHIPPED | OUTPATIENT
Start: 2025-08-04

## 2025-08-04 RX ORDER — LEVETIRACETAM 1000 MG/1
1000 TABLET ORAL NIGHTLY
Qty: 30 TABLET | Refills: 5 | Status: SHIPPED | OUTPATIENT
Start: 2025-08-04

## 2025-08-22 DIAGNOSIS — G40.909 SEIZURE DISORDER (HCC): ICD-10-CM

## 2025-08-22 RX ORDER — ESCITALOPRAM OXALATE 10 MG/1
10 TABLET ORAL DAILY
Qty: 90 TABLET | Refills: 0 | Status: SHIPPED | OUTPATIENT
Start: 2025-08-22

## 2025-08-28 DIAGNOSIS — G40.909 SEIZURE DISORDER (HCC): ICD-10-CM

## 2025-08-29 RX ORDER — ESCITALOPRAM OXALATE 10 MG/1
10 TABLET ORAL DAILY
Qty: 90 TABLET | Refills: 0 | OUTPATIENT
Start: 2025-08-29

## (undated) NOTE — LETTER
12/08/20  Shamir Glover  [address] [place],    We are contacting you from Dr. Eleazar Guillen office. Your health is important to us.   Therefore, we are sending this friendly reminder that you have the following ove

## (undated) NOTE — LETTER
Date: 5/12/2022    Patient Name: Zuri Hancock          To Whom it may concern: The above patient was seen at the [facility] for treatment of a medical condition. This patient should be excused from attending work for today. If any questions or concerns please call the office.       Sincerely,      Cathy Powell MD

## (undated) NOTE — ED AVS SNAPSHOT
Renae Reed   MRN: [id number]    Department:  [facility] Emergency Department   Date of Visit:  9/20/2019           Disclosure     Insurance plans vary and the physician(s) referred by the ER may not be covered by your plan.  Please contact tell this physician (or your personal doctor if your instructions are to return to your personal doctor) about any new or lasting problems. The primary care or specialist physician will see patients referred from the [facility] Emergency Department.  Charly Spears

## (undated) NOTE — LETTER
Date: 9/10/2019    Patient Name: Herminia Walsh          To Whom it may concern:     This letter has been written at the patient's request. The above patient was seen at the [facility] for treatment of a medical condition on 9/10/2019    T

## (undated) NOTE — ED AVS SNAPSHOT
Jose Angel Hart   MRN: [id number]    Department:  [facility] Emergency Department   Date of Visit:  10/15/2019           Disclosure     Insurance plans vary and the physician(s) referred by the ER may not be covered by your plan.  Please contact tell this physician (or your personal doctor if your instructions are to return to your personal doctor) about any new or lasting problems. The primary care or specialist physician will see patients referred from the [facility] Emergency Department.  Kady Solano

## (undated) NOTE — ED AVS SNAPSHOT
Marimar Hyde   MRN: [id number]    Department:  [facility] Emergency Department   Date of Visit:  8/26/2019           Disclosure     Insurance plans vary and the physician(s) referred by the ER may not be covered by your plan.  Please contact tell this physician (or your personal doctor if your instructions are to return to your personal doctor) about any new or lasting problems. The primary care or specialist physician will see patients referred from the [facility] Emergency Department.  Severo Pastures

## (undated) NOTE — LETTER
Date & Time: 8/27/2019, 2:50 PM  Patient: Jenae Villegas  Encounter Provider(s):    Denver Byers, MD       To Whom It May Concern:    Shaqcella Labor was seen and treated in our department on 8/27/2019.  Please excuse patient from work/school due to

## (undated) NOTE — ED AVS SNAPSHOT
Leno Anastasiasumeet   MRN: [id number]    Department:  [facility] Emergency Department   Date of Visit:  8/27/2019           Disclosure     Insurance plans vary and the physician(s) referred by the ER may not be covered by your plan.  Please contact tell this physician (or your personal doctor if your instructions are to return to your personal doctor) about any new or lasting problems. The primary care or specialist physician will see patients referred from the [facility] Emergency Department.  Charly Spears